# Patient Record
Sex: FEMALE | Race: WHITE | NOT HISPANIC OR LATINO | Employment: OTHER | URBAN - METROPOLITAN AREA
[De-identification: names, ages, dates, MRNs, and addresses within clinical notes are randomized per-mention and may not be internally consistent; named-entity substitution may affect disease eponyms.]

---

## 2024-09-13 ENCOUNTER — APPOINTMENT (OUTPATIENT)
Dept: CT IMAGING | Facility: CLINIC | Age: 79
End: 2024-09-13
Attending: EMERGENCY MEDICINE
Payer: COMMERCIAL

## 2024-09-13 ENCOUNTER — HOSPITAL ENCOUNTER (OUTPATIENT)
Facility: CLINIC | Age: 79
Setting detail: OBSERVATION
Discharge: HOME | End: 2024-09-15
Attending: EMERGENCY MEDICINE | Admitting: EMERGENCY MEDICINE
Payer: COMMERCIAL

## 2024-09-13 ENCOUNTER — APPOINTMENT (OUTPATIENT)
Dept: MRI IMAGING | Facility: CLINIC | Age: 79
End: 2024-09-13
Attending: STUDENT IN AN ORGANIZED HEALTH CARE EDUCATION/TRAINING PROGRAM
Payer: COMMERCIAL

## 2024-09-13 DIAGNOSIS — R47.81 SLURRED SPEECH: ICD-10-CM

## 2024-09-13 DIAGNOSIS — R29.6 MULTIPLE FALLS: ICD-10-CM

## 2024-09-13 DIAGNOSIS — R29.818 TRANSIENT NEUROLOGICAL SYMPTOMS: ICD-10-CM

## 2024-09-13 LAB
ALBUMIN SERPL BCG-MCNC: 4.3 G/DL (ref 3.5–5.2)
ALBUMIN UR-MCNC: NEGATIVE MG/DL
ALP SERPL-CCNC: 85 U/L (ref 40–150)
ALT SERPL W P-5'-P-CCNC: 23 U/L (ref 0–50)
ANION GAP SERPL CALCULATED.3IONS-SCNC: 12 MMOL/L (ref 7–15)
APPEARANCE UR: CLEAR
AST SERPL W P-5'-P-CCNC: 25 U/L (ref 0–45)
BASOPHILS # BLD AUTO: 0.1 10E3/UL (ref 0–0.2)
BASOPHILS NFR BLD AUTO: 0 %
BILIRUB DIRECT SERPL-MCNC: 0.2 MG/DL (ref 0–0.3)
BILIRUB SERPL-MCNC: 1 MG/DL
BILIRUB UR QL STRIP: NEGATIVE
BUN SERPL-MCNC: 12.7 MG/DL (ref 8–23)
CALCIUM SERPL-MCNC: 9.2 MG/DL (ref 8.8–10.4)
CHLORIDE SERPL-SCNC: 95 MMOL/L (ref 98–107)
COLOR UR AUTO: NORMAL
CREAT SERPL-MCNC: 1.12 MG/DL (ref 0.51–0.95)
EGFRCR SERPLBLD CKD-EPI 2021: 51 ML/MIN/1.73M2
EOSINOPHIL # BLD AUTO: 0 10E3/UL (ref 0–0.7)
EOSINOPHIL NFR BLD AUTO: 0 %
ERYTHROCYTE [DISTWIDTH] IN BLOOD BY AUTOMATED COUNT: 14 % (ref 10–15)
GLUCOSE SERPL-MCNC: 112 MG/DL (ref 70–99)
GLUCOSE UR STRIP-MCNC: NEGATIVE MG/DL
HCO3 SERPL-SCNC: 24 MMOL/L (ref 22–29)
HCT VFR BLD AUTO: 32.6 % (ref 35–47)
HGB BLD-MCNC: 10.9 G/DL (ref 11.7–15.7)
HGB UR QL STRIP: NEGATIVE
IMM GRANULOCYTES # BLD: 0.1 10E3/UL
IMM GRANULOCYTES NFR BLD: 1 %
KETONES UR STRIP-MCNC: NEGATIVE MG/DL
LEUKOCYTE ESTERASE UR QL STRIP: NEGATIVE
LYMPHOCYTES # BLD AUTO: 2.3 10E3/UL (ref 0.8–5.3)
LYMPHOCYTES NFR BLD AUTO: 18 %
MAGNESIUM SERPL-MCNC: 2 MG/DL (ref 1.7–2.3)
MCH RBC QN AUTO: 29.8 PG (ref 26.5–33)
MCHC RBC AUTO-ENTMCNC: 33.4 G/DL (ref 31.5–36.5)
MCV RBC AUTO: 89 FL (ref 78–100)
MONOCYTES # BLD AUTO: 1.3 10E3/UL (ref 0–1.3)
MONOCYTES NFR BLD AUTO: 10 %
NEUTROPHILS # BLD AUTO: 9.4 10E3/UL (ref 1.6–8.3)
NEUTROPHILS NFR BLD AUTO: 72 %
NITRATE UR QL: NEGATIVE
NRBC # BLD AUTO: 0 10E3/UL
NRBC BLD AUTO-RTO: 0 /100
PH UR STRIP: 6.5 [PH] (ref 5–7)
PHOSPHATE SERPL-MCNC: 3.3 MG/DL (ref 2.5–4.5)
PLATELET # BLD AUTO: 225 10E3/UL (ref 150–450)
POTASSIUM SERPL-SCNC: 4.1 MMOL/L (ref 3.4–5.3)
PROT SERPL-MCNC: 7.1 G/DL (ref 6.4–8.3)
RBC # BLD AUTO: 3.66 10E6/UL (ref 3.8–5.2)
RBC URINE: 1 /HPF
SARS-COV-2 RNA RESP QL NAA+PROBE: NEGATIVE
SODIUM SERPL-SCNC: 131 MMOL/L (ref 135–145)
SP GR UR STRIP: 1 (ref 1–1.03)
SQUAMOUS EPITHELIAL: <1 /HPF
UROBILINOGEN UR STRIP-MCNC: NORMAL MG/DL
WBC # BLD AUTO: 13.1 10E3/UL (ref 4–11)
WBC URINE: <1 /HPF

## 2024-09-13 PROCEDURE — 70496 CT ANGIOGRAPHY HEAD: CPT

## 2024-09-13 PROCEDURE — 80053 COMPREHEN METABOLIC PANEL: CPT | Performed by: EMERGENCY MEDICINE

## 2024-09-13 PROCEDURE — 250N000009 HC RX 250: Performed by: EMERGENCY MEDICINE

## 2024-09-13 PROCEDURE — 99285 EMERGENCY DEPT VISIT HI MDM: CPT | Mod: 25

## 2024-09-13 PROCEDURE — 250N000013 HC RX MED GY IP 250 OP 250 PS 637: Performed by: INTERNAL MEDICINE

## 2024-09-13 PROCEDURE — G0378 HOSPITAL OBSERVATION PER HR: HCPCS

## 2024-09-13 PROCEDURE — 81001 URINALYSIS AUTO W/SCOPE: CPT | Performed by: EMERGENCY MEDICINE

## 2024-09-13 PROCEDURE — 80048 BASIC METABOLIC PNL TOTAL CA: CPT | Performed by: EMERGENCY MEDICINE

## 2024-09-13 PROCEDURE — 36415 COLL VENOUS BLD VENIPUNCTURE: CPT | Performed by: EMERGENCY MEDICINE

## 2024-09-13 PROCEDURE — 87635 SARS-COV-2 COVID-19 AMP PRB: CPT | Performed by: EMERGENCY MEDICINE

## 2024-09-13 PROCEDURE — 84100 ASSAY OF PHOSPHORUS: CPT | Performed by: INTERNAL MEDICINE

## 2024-09-13 PROCEDURE — 82248 BILIRUBIN DIRECT: CPT | Performed by: INTERNAL MEDICINE

## 2024-09-13 PROCEDURE — 85025 COMPLETE CBC W/AUTO DIFF WBC: CPT | Performed by: EMERGENCY MEDICINE

## 2024-09-13 PROCEDURE — 258N000003 HC RX IP 258 OP 636: Performed by: INTERNAL MEDICINE

## 2024-09-13 PROCEDURE — 99222 1ST HOSP IP/OBS MODERATE 55: CPT | Performed by: INTERNAL MEDICINE

## 2024-09-13 PROCEDURE — 250N000011 HC RX IP 250 OP 636: Performed by: EMERGENCY MEDICINE

## 2024-09-13 PROCEDURE — 70551 MRI BRAIN STEM W/O DYE: CPT

## 2024-09-13 PROCEDURE — 70450 CT HEAD/BRAIN W/O DYE: CPT

## 2024-09-13 PROCEDURE — 83735 ASSAY OF MAGNESIUM: CPT | Performed by: INTERNAL MEDICINE

## 2024-09-13 RX ORDER — GABAPENTIN 100 MG/1
200 CAPSULE ORAL
Status: DISCONTINUED | OUTPATIENT
Start: 2024-09-14 | End: 2024-09-15 | Stop reason: HOSPADM

## 2024-09-13 RX ORDER — LEVOTHYROXINE SODIUM 50 UG/1
50 TABLET ORAL DAILY
COMMUNITY

## 2024-09-13 RX ORDER — BUPROPION HYDROCHLORIDE 150 MG/1
150 TABLET ORAL EVERY MORNING
COMMUNITY

## 2024-09-13 RX ORDER — PROCHLORPERAZINE 25 MG
12.5 SUPPOSITORY, RECTAL RECTAL EVERY 12 HOURS PRN
Status: DISCONTINUED | OUTPATIENT
Start: 2024-09-13 | End: 2024-09-15 | Stop reason: HOSPADM

## 2024-09-13 RX ORDER — ACETAMINOPHEN 325 MG/10.15ML
650 LIQUID ORAL EVERY 4 HOURS PRN
Status: DISCONTINUED | OUTPATIENT
Start: 2024-09-13 | End: 2024-09-15 | Stop reason: HOSPADM

## 2024-09-13 RX ORDER — ATORVASTATIN CALCIUM 40 MG/1
40 TABLET, FILM COATED ORAL DAILY
COMMUNITY

## 2024-09-13 RX ORDER — ASPIRIN 81 MG/1
81 TABLET, CHEWABLE ORAL DAILY
COMMUNITY

## 2024-09-13 RX ORDER — GABAPENTIN 300 MG/1
600 CAPSULE ORAL 2 TIMES DAILY
Status: DISCONTINUED | OUTPATIENT
Start: 2024-09-13 | End: 2024-09-15 | Stop reason: HOSPADM

## 2024-09-13 RX ORDER — FLUTICASONE FUROATE AND VILANTEROL 100; 25 UG/1; UG/1
1 POWDER RESPIRATORY (INHALATION) DAILY
Status: DISCONTINUED | OUTPATIENT
Start: 2024-09-14 | End: 2024-09-15 | Stop reason: HOSPADM

## 2024-09-13 RX ORDER — ASPIRIN 81 MG/1
81 TABLET, CHEWABLE ORAL DAILY
Status: DISCONTINUED | OUTPATIENT
Start: 2024-09-14 | End: 2024-09-15 | Stop reason: HOSPADM

## 2024-09-13 RX ORDER — IOPAMIDOL 755 MG/ML
67 INJECTION, SOLUTION INTRAVASCULAR ONCE
Status: COMPLETED | OUTPATIENT
Start: 2024-09-13 | End: 2024-09-13

## 2024-09-13 RX ORDER — ALENDRONATE SODIUM 70 MG/1
70 TABLET ORAL
COMMUNITY

## 2024-09-13 RX ORDER — ONDANSETRON 4 MG/1
4 TABLET, ORALLY DISINTEGRATING ORAL EVERY 6 HOURS PRN
Status: DISCONTINUED | OUTPATIENT
Start: 2024-09-13 | End: 2024-09-15 | Stop reason: HOSPADM

## 2024-09-13 RX ORDER — GABAPENTIN 300 MG/1
300 CAPSULE ORAL
COMMUNITY

## 2024-09-13 RX ORDER — CYCLOSPORINE 0.5 MG/ML
1 EMULSION OPHTHALMIC 2 TIMES DAILY
COMMUNITY

## 2024-09-13 RX ORDER — ONDANSETRON 2 MG/ML
4 INJECTION INTRAMUSCULAR; INTRAVENOUS EVERY 6 HOURS PRN
Status: DISCONTINUED | OUTPATIENT
Start: 2024-09-13 | End: 2024-09-15 | Stop reason: HOSPADM

## 2024-09-13 RX ORDER — GABAPENTIN 300 MG/1
600 CAPSULE ORAL 2 TIMES DAILY
COMMUNITY

## 2024-09-13 RX ORDER — PROCHLORPERAZINE MALEATE 5 MG
5 TABLET ORAL EVERY 6 HOURS PRN
Status: DISCONTINUED | OUTPATIENT
Start: 2024-09-13 | End: 2024-09-15 | Stop reason: HOSPADM

## 2024-09-13 RX ORDER — SERTRALINE HYDROCHLORIDE 100 MG/1
150 TABLET, FILM COATED ORAL DAILY
COMMUNITY

## 2024-09-13 RX ORDER — BUPROPION HYDROCHLORIDE 150 MG/1
150 TABLET ORAL EVERY MORNING
Status: DISCONTINUED | OUTPATIENT
Start: 2024-09-14 | End: 2024-09-15 | Stop reason: HOSPADM

## 2024-09-13 RX ORDER — ATORVASTATIN CALCIUM 40 MG/1
40 TABLET, FILM COATED ORAL DAILY
Status: DISCONTINUED | OUTPATIENT
Start: 2024-09-14 | End: 2024-09-15 | Stop reason: HOSPADM

## 2024-09-13 RX ORDER — LEVOTHYROXINE SODIUM 50 UG/1
50 TABLET ORAL DAILY
Status: DISCONTINUED | OUTPATIENT
Start: 2024-09-14 | End: 2024-09-15 | Stop reason: HOSPADM

## 2024-09-13 RX ORDER — LIDOCAINE 40 MG/G
CREAM TOPICAL
Status: DISCONTINUED | OUTPATIENT
Start: 2024-09-13 | End: 2024-09-15 | Stop reason: HOSPADM

## 2024-09-13 RX ORDER — ACETAMINOPHEN 650 MG/1
650 SUPPOSITORY RECTAL EVERY 4 HOURS PRN
Status: DISCONTINUED | OUTPATIENT
Start: 2024-09-13 | End: 2024-09-15 | Stop reason: HOSPADM

## 2024-09-13 RX ORDER — ALBUTEROL SULFATE 90 UG/1
1-2 AEROSOL, METERED RESPIRATORY (INHALATION) EVERY 6 HOURS PRN
COMMUNITY

## 2024-09-13 RX ORDER — ALBUTEROL SULFATE 90 UG/1
1-2 AEROSOL, METERED RESPIRATORY (INHALATION) EVERY 6 HOURS PRN
Status: DISCONTINUED | OUTPATIENT
Start: 2024-09-13 | End: 2024-09-15 | Stop reason: HOSPADM

## 2024-09-13 RX ORDER — ACETAMINOPHEN 325 MG/1
650 TABLET ORAL EVERY 4 HOURS PRN
Status: DISCONTINUED | OUTPATIENT
Start: 2024-09-13 | End: 2024-09-15 | Stop reason: HOSPADM

## 2024-09-13 RX ADMIN — GABAPENTIN 600 MG: 300 CAPSULE ORAL at 23:40

## 2024-09-13 RX ADMIN — SODIUM CHLORIDE 500 ML: 9 INJECTION, SOLUTION INTRAVENOUS at 23:36

## 2024-09-13 RX ADMIN — SODIUM CHLORIDE 80 ML: 9 INJECTION, SOLUTION INTRAVENOUS at 19:28

## 2024-09-13 RX ADMIN — IOPAMIDOL 67 ML: 755 INJECTION, SOLUTION INTRAVENOUS at 19:27

## 2024-09-13 ASSESSMENT — ACTIVITIES OF DAILY LIVING (ADL)
ADLS_ACUITY_SCORE: 35

## 2024-09-13 NOTE — ED PROVIDER NOTES
Emergency Department Note      History of Present Illness     Chief Complaint   Lower extremity weakness/gait problem and slurred speech      HPI   Shae Chinchilla is a 78 year old female presents with lower extremity weakness and episode of slurred speech.  Patient reports that 2 days ago she was experiencing some feeling of weakness in her lower extremities that had since resolved.  Today at 4:45 PM she was picking rocks with her daughter for rock painting which she had a sudden onset of gait instability/weakness in her lower extremities as well as 15 minutes of notable slurred speech.  EMS contacted.  On arrival patient was noted to be unsteady in her feet though able to stand and pivot to the gurney.  Blood glucose 138 with blood pressure of 120s/60s with heart rate in 60s.  No reported dizziness, lightheadedness, or loss of consciousness.  Patient also denies chest pain, shortness of breath, or abdominal pain.  Symptoms are now resolved.  Past history of substance abuse; endorses years of sobriety.  Former smoker.    Independent Historian   Daughter as detailed above.    Review of External Notes   N/A    Past Medical History     Medical History and Problem List   Depression  Squamous cell carcinoma of skin  Coronary atherosclerosis  PFO  COPD  Hyperlipidemia  History of substance abuse   Stroke      Medications   Medications  Medication Sig Dispensed Refills Start Date End Date Status   Calcium Carb-Cholecalciferol (CALCIUM CARBONATE-VITAMIN D3) 600-400 MG-UNIT TABS  Take by mouth daily.     09/04/2022   Active   multivitamin with minerals tablet  Take 1 Tablet by mouth daily.     09/04/2022   Active   ASPIRIN LOW DOSE 81 MG enteric coated tablet  Take 1 Tablet (81 mg) by mouth daily.     04/06/2023   Active   tazarotene (TAZORAC) 0.1 % cream   Indications: Acne vulgaris Apply topically daily at bedtime. 60 g  11 07/27/2023   Active   clindamycin (CLEOCIN T) 1 % external solution   Indications: Acne vulgaris  Apply topically two times a day. 60 mL  11 07/27/2023   Active   alendronate (FOSAMAX) 70 MG tablet  Take 1 Tablet (70 mg) by mouth once every week. 12 Tablet  3 03/20/2024   Active   ZOLOFT 100 MG tablet   Indications: Major depressive disorder with single episode, in partial remission (HRC) TAKE 1 AND 1/2 TABLETS DAILY 135 Tablet  3 05/14/2024   Active   fluticasone-umeclidin-vilant (TRELEGY ELLIPTA) 100-62.5-25 MCG/ACT inhaler   Indications: Chronic obstructive pulmonary disease, unspecified COPD type (HRC) Inhale 1 Puff daily. Rinse mouth/gargle after use 3 Each  3 08/12/2024   Active   omeprazole (PRILOSEC) 20 MG capsule   Indications: Gastroesophageal reflux disease, unspecified whether esophagitis present Take 1 Capsule (20 mg) by mouth daily. Take 1 hour before a meal. 90 Capsule  3 08/12/2024   Active   gabapentin (NEURONTIN) 300 MG capsule   Indications: Low back pain radiating to left leg Take 2 Capsules (600 mg) by mouth two times a day AND 1 Capsule (300 mg) daily. 600 mg morning and evening and 300 mg mid-day. 450 Capsule  3 08/12/2024   Active   buPROPion (WELLBUTRIN XL) 150 MG 24 hour release tablet   Indications: Major depressive disorder with single episode, in partial remission (HRC) Take 1 Tablet (150 mg) by mouth daily. 90 Tablet  3 08/12/2024   Active   levothyroxine (SYNTHROID) 50 MCG tablet   Indications: Acquired hypothyroidism (HRC) Take 1 Tablet (50 mcg) by mouth daily. 90 Tablet  3 08/12/2024   Active   atorvastatin (LIPITOR) 40 MG tablet  Take 1 Tablet (40 mg) by mouth daily. 90 Tablet  3 08/12/2024   Active   cycloSPORINE (RESTASIS) 0.05 % eye drop emulsion   Indications: Dry eyes Place 1 Drop into both eyes two times a day. 5.5 mL  6 08/14/2024   Active   ALBUterol sulfate  (90 Base) MCG/ACT inhaler   Indications: Chronic obstructive pulmonary disease, unspecified COPD type (HRC) INHALE 1 TO 2 PUFFS EVERY 6 HOURS AS NEEDED FOR WHEEZING. 3 Each  3 08/23/2024   Active   ALBUterol  "sulfate  (90 Base) MCG/ACT inhaler   Indications: Chronic obstructive pulmonary disease, unspecified COPD type (HRC) INHALE 1 TO 2 PUFFS EVERY 6 HOURS AS NEEDED FOR WHEEZING. 8.5 g  3 05/14/2024 08/23/2024 Discontinued     Surgical History   Cosmetic surgery  Tubal ligation    Physical Exam     Patient Vitals for the past 24 hrs:   BP Temp Temp src Pulse Resp SpO2 Height Weight   09/13/24 2002 -- 98.4  F (36.9  C) Oral -- -- -- -- --   09/13/24 1905 113/64 -- -- 81 20 94 % -- --   09/13/24 1800 102/61 -- -- 85 21 94 % -- --   09/13/24 1725 (!) 141/78 100  F (37.8  C) Tympanic 89 17 95 % 1.575 m (5' 2\") 63.5 kg (140 lb)     Physical Exam  General: Alert and cooperative with exam. Patient in mild distress. Normal mentation.  Head:  Scalp is NC/AT  Eyes:  No scleral icterus, PERRL, EOMI  ENT:  The external nose and ears are normal. The oropharynx is normal and without erythema; mucus membranes are moist. Uvula midline, no evidence of deep space infection.  Neck:  Normal range of motion without rigidity.  CV:  Regular rate and rhythm  Resp:  Breath sounds are clear bilaterally    Non-labored, no retractions or accessory muscle use  GI:  Abdomen is soft, no distension, no tenderness. No peritoneal signs  MS:  No lower extremity edema   Skin:  Warm and dry, No rash or lesions noted.  Neuro: Oriented x 3. No gross motor or sensory deficits.  CN II through XII intact.  Normal upper and lower extremity coordination.  No significant dysarthria or aphasia.      Diagnostics     Lab Results   Labs Ordered and Resulted from Time of ED Arrival to Time of ED Departure   BASIC METABOLIC PANEL - Abnormal       Result Value    Sodium 131 (*)     Potassium 4.1      Chloride 95 (*)     Carbon Dioxide (CO2) 24      Anion Gap 12      Urea Nitrogen 12.7      Creatinine 1.12 (*)     GFR Estimate 51 (*)     Calcium 9.2      Glucose 112 (*)    CBC WITH PLATELETS AND DIFFERENTIAL - Abnormal    WBC Count 13.1 (*)     RBC Count 3.66 (*)  "    Hemoglobin 10.9 (*)     Hematocrit 32.6 (*)     MCV 89      MCH 29.8      MCHC 33.4      RDW 14.0      Platelet Count 225      % Neutrophils 72      % Lymphocytes 18      % Monocytes 10      % Eosinophils 0      % Basophils 0      % Immature Granulocytes 1      NRBCs per 100 WBC 0      Absolute Neutrophils 9.4 (*)     Absolute Lymphocytes 2.3      Absolute Monocytes 1.3      Absolute Eosinophils 0.0      Absolute Basophils 0.1      Absolute Immature Granulocytes 0.1      Absolute NRBCs 0.0     ROUTINE UA WITH MICROSCOPIC - Normal    Color Urine Straw      Appearance Urine Clear      Glucose Urine Negative      Bilirubin Urine Negative      Ketones Urine Negative      Specific Gravity Urine 1.005      Blood Urine Negative      pH Urine 6.5      Protein Albumin Urine Negative      Urobilinogen Urine Normal      Nitrite Urine Negative      Leukocyte Esterase Urine Negative      RBC Urine 1      WBC Urine <1      Squamous Epithelials Urine <1     COVID-19 VIRUS (CORONAVIRUS) BY PCR - Normal    SARS CoV2 PCR Negative     HEPATIC FUNCTION PANEL - Normal    Protein Total 7.1      Albumin 4.3      Bilirubin Total 1.0      Alkaline Phosphatase 85      AST 25      ALT 23      Bilirubin Direct 0.20     PHOSPHORUS   MAGNESIUM       Imaging   CTA Head Neck with Contrast   Final Result   IMPRESSION:    HEAD CT:   1.  No acute intracranial findings.   2.  Moderate presumed chronic microvascular ischemic changes of the white matter.      HEAD CTA:    1.  No significant stenosis or occlusion of the [place] vasculature.      NECK CTA:   1.  No significant stenosis of the major arterial vasculature of the neck.      Head CT w/o contrast   Final Result   IMPRESSION:    HEAD CT:   1.  No acute intracranial findings.   2.  Moderate presumed chronic microvascular ischemic changes of the white matter.      HEAD CTA:    1.  No significant stenosis or occlusion of the [place] vasculature.      NECK CTA:   1.  No  significant stenosis of the major arterial vasculature of the neck.      MR Brain w/o Contrast    (Results Pending)       ECG  ECG taken at 1724, ECG read at 2000  Normal sinus rhythm  Normal ECG   Rate 87 bpm. NE interval 198 ms. QRS duration 80 ms. QT/QTc 362/435 ms. P-R-T axes -13 37 42.     Independent Interpretation   CT Head: No intracranial hemorrhage.    ED Course      Medications Administered   Medications   iopamidol (ISOVUE-370) solution 67 mL (67 mLs Intravenous $Given 9/13/24 1927)   Saline Flush (80 mLs Intravenous $Given 9/13/24 1928)       Procedures   Procedures     Discussion of Management   Admitting Hospitalist, Dr. Martin  Stroke neurology fellow    ED Course   ED Course as of 09/13/24 2157   Fri Sep 13, 2024   1714 I obtained history and examined the patient as noted above.       Additional Documentation  None    Medical Decision Making / Diagnosis     CMS Diagnoses: None    MIPS       None    MDM   Shae Chinchilla is a 78 year old female presents with episode of slurred speech as well as lower extremity weakness/gait instability.  Patient's medical history and records were reviewed.  On evaluation the patient's NIH stroke score is 0 and her symptoms have resolved.  No evidence of significant trauma related to recent multiple falls.  CT/CTA imaging of the patient's head and neck without acute pathology as above.  EKG demonstrates normal sinus rhythm without evidence of acute ischemia, infarction, or significant arrhythmia.  Labs notable for mild hyponatremia (sodium 131), mildly elevated white count (13.1; no evidence of infectious process), mild anemia (hemoglobin 10.9), and UA without evidence of infection.  Initial temperature was 100  F, this improved without intervention.  No clinical evidence of encephalitis/meningitis.  COVID testing negative.  Discussed with stroke neurology service; recommended brain MRI.  Given patient's age, multiple recent falls (lives independently), and concern  for potential TIA, patient was admitted to observation with the hospitalist service; will defer MRI to inpatient team.    Disposition   The patient was admitted to the hospital.     Diagnosis     ICD-10-CM    1. Transient neurological symptoms  R29.818       2. Slurred speech  R47.81       3. Multiple falls  R29.6                 William Dixon,   09/13/24 2206

## 2024-09-13 NOTE — ED TRIAGE NOTES
BIBA from home for heaviness in both legs, unsteady gait, and 10 minute episode of slurred speech earlier today. Patient states she has had multiple falls today with headstrike.      Triage Assessment (Adult)       Row Name 09/13/24 1754 09/13/24 1727       Triage Assessment    Airway WDL -- WDL       Respiratory WDL    Respiratory WDL -- WDL       Skin Circulation/Temperature WDL    Skin Circulation/Temperature WDL -- WDL       Cardiac WDL    Cardiac WDL -- WDL       Peripheral/Neurovascular WDL    Peripheral Neurovascular WDL -- WDL       Cognitive/Neuro/Behavioral WDL    Cognitive/Neuro/Behavioral WDL WDL --    Level of Consciousness alert --    Arousal Level opens eyes spontaneously --    Orientation oriented x 4 --    Mood/Behavior calm;cooperative --       Pupils (CN II)    Pupil PERRLA yes --    Pupil Size Left 3 mm --    Pupil Size Right 3 mm --

## 2024-09-13 NOTE — ED NOTES
Bed: ED06  Expected date:   Expected time:   Means of arrival:   Comments:  523   78 f sudden onset of weakness in lower extremeties  1705

## 2024-09-14 ENCOUNTER — APPOINTMENT (OUTPATIENT)
Dept: CARDIOLOGY | Facility: CLINIC | Age: 79
End: 2024-09-14
Attending: INTERNAL MEDICINE
Payer: COMMERCIAL

## 2024-09-14 ENCOUNTER — APPOINTMENT (OUTPATIENT)
Dept: GENERAL RADIOLOGY | Facility: CLINIC | Age: 79
End: 2024-09-14
Attending: INTERNAL MEDICINE
Payer: COMMERCIAL

## 2024-09-14 ENCOUNTER — APPOINTMENT (OUTPATIENT)
Dept: PHYSICAL THERAPY | Facility: CLINIC | Age: 79
End: 2024-09-14
Attending: INTERNAL MEDICINE
Payer: COMMERCIAL

## 2024-09-14 LAB
ANION GAP SERPL CALCULATED.3IONS-SCNC: 10 MMOL/L (ref 7–15)
BUN SERPL-MCNC: 12.2 MG/DL (ref 8–23)
CALCIUM SERPL-MCNC: 8.8 MG/DL (ref 8.8–10.4)
CHLORIDE SERPL-SCNC: 106 MMOL/L (ref 98–107)
CREAT SERPL-MCNC: 1.02 MG/DL (ref 0.51–0.95)
EGFRCR SERPLBLD CKD-EPI 2021: 57 ML/MIN/1.73M2
ERYTHROCYTE [DISTWIDTH] IN BLOOD BY AUTOMATED COUNT: 13.9 % (ref 10–15)
GLUCOSE BLDC GLUCOMTR-MCNC: 101 MG/DL (ref 70–99)
GLUCOSE BLDC GLUCOMTR-MCNC: 103 MG/DL (ref 70–99)
GLUCOSE BLDC GLUCOMTR-MCNC: 92 MG/DL (ref 70–99)
GLUCOSE BLDC GLUCOMTR-MCNC: 99 MG/DL (ref 70–99)
GLUCOSE SERPL-MCNC: 109 MG/DL (ref 70–99)
HCO3 SERPL-SCNC: 26 MMOL/L (ref 22–29)
HCT VFR BLD AUTO: 33.4 % (ref 35–47)
HGB BLD-MCNC: 11.1 G/DL (ref 11.7–15.7)
LVEF ECHO: NORMAL
MCH RBC QN AUTO: 30.3 PG (ref 26.5–33)
MCHC RBC AUTO-ENTMCNC: 33.2 G/DL (ref 31.5–36.5)
MCV RBC AUTO: 91 FL (ref 78–100)
PLATELET # BLD AUTO: 215 10E3/UL (ref 150–450)
POTASSIUM SERPL-SCNC: 4.1 MMOL/L (ref 3.4–5.3)
RBC # BLD AUTO: 3.66 10E6/UL (ref 3.8–5.2)
SODIUM SERPL-SCNC: 142 MMOL/L (ref 135–145)
WBC # BLD AUTO: 9.8 10E3/UL (ref 4–11)

## 2024-09-14 PROCEDURE — 36415 COLL VENOUS BLD VENIPUNCTURE: CPT | Performed by: INTERNAL MEDICINE

## 2024-09-14 PROCEDURE — 250N000013 HC RX MED GY IP 250 OP 250 PS 637: Performed by: INTERNAL MEDICINE

## 2024-09-14 PROCEDURE — 255N000002 HC RX 255 OP 636: Performed by: INTERNAL MEDICINE

## 2024-09-14 PROCEDURE — 97161 PT EVAL LOW COMPLEX 20 MIN: CPT | Mod: GP

## 2024-09-14 PROCEDURE — 99232 SBSQ HOSP IP/OBS MODERATE 35: CPT | Performed by: INTERNAL MEDICINE

## 2024-09-14 PROCEDURE — G0378 HOSPITAL OBSERVATION PER HR: HCPCS

## 2024-09-14 PROCEDURE — 85014 HEMATOCRIT: CPT | Performed by: INTERNAL MEDICINE

## 2024-09-14 PROCEDURE — 80048 BASIC METABOLIC PNL TOTAL CA: CPT | Performed by: INTERNAL MEDICINE

## 2024-09-14 PROCEDURE — 93306 TTE W/DOPPLER COMPLETE: CPT | Mod: 26 | Performed by: INTERNAL MEDICINE

## 2024-09-14 PROCEDURE — 71046 X-RAY EXAM CHEST 2 VIEWS: CPT

## 2024-09-14 PROCEDURE — C8929 TTE W OR WO FOL WCON,DOPPLER: HCPCS

## 2024-09-14 PROCEDURE — 99221 1ST HOSP IP/OBS SF/LOW 40: CPT | Mod: GC | Performed by: STUDENT IN AN ORGANIZED HEALTH CARE EDUCATION/TRAINING PROGRAM

## 2024-09-14 PROCEDURE — 82962 GLUCOSE BLOOD TEST: CPT

## 2024-09-14 RX ORDER — CYCLOSPORINE 0.5 MG/ML
1 EMULSION OPHTHALMIC 2 TIMES DAILY
Status: DISCONTINUED | OUTPATIENT
Start: 2024-09-14 | End: 2024-09-15 | Stop reason: HOSPADM

## 2024-09-14 RX ADMIN — ATORVASTATIN CALCIUM 40 MG: 40 TABLET, FILM COATED ORAL at 09:31

## 2024-09-14 RX ADMIN — SERTRALINE HYDROCHLORIDE 150 MG: 100 TABLET ORAL at 09:31

## 2024-09-14 RX ADMIN — GABAPENTIN 600 MG: 300 CAPSULE ORAL at 20:12

## 2024-09-14 RX ADMIN — HUMAN ALBUMIN MICROSPHERES AND PERFLUTREN 9 ML: 10; .22 INJECTION, SOLUTION INTRAVENOUS at 14:59

## 2024-09-14 RX ADMIN — GABAPENTIN 600 MG: 300 CAPSULE ORAL at 09:30

## 2024-09-14 RX ADMIN — BUPROPION HYDROCHLORIDE 150 MG: 150 TABLET, EXTENDED RELEASE ORAL at 09:31

## 2024-09-14 RX ADMIN — FLUTICASONE FUROATE AND VILANTEROL TRIFENATATE 1 PUFF: 100; 25 POWDER RESPIRATORY (INHALATION) at 09:37

## 2024-09-14 RX ADMIN — GABAPENTIN 200 MG: 100 CAPSULE ORAL at 16:43

## 2024-09-14 RX ADMIN — LEVOTHYROXINE SODIUM 50 MCG: 50 TABLET ORAL at 09:30

## 2024-09-14 RX ADMIN — ASPIRIN 81 MG CHEWABLE TABLET 81 MG: 81 TABLET CHEWABLE at 09:30

## 2024-09-14 RX ADMIN — UMECLIDINIUM 1 PUFF: 62.5 AEROSOL, POWDER ORAL at 09:38

## 2024-09-14 ASSESSMENT — ACTIVITIES OF DAILY LIVING (ADL)
ADLS_ACUITY_SCORE: 34
DEPENDENT_IADLS:: TRANSPORTATION
ADLS_ACUITY_SCORE: 34
ADLS_ACUITY_SCORE: 33
ADLS_ACUITY_SCORE: 34
ADLS_ACUITY_SCORE: 33
ADLS_ACUITY_SCORE: 34
ADLS_ACUITY_SCORE: 34
ADLS_ACUITY_SCORE: 33
ADLS_ACUITY_SCORE: 34
ADLS_ACUITY_SCORE: 33
ADLS_ACUITY_SCORE: 34
ADLS_ACUITY_SCORE: 34
ADLS_ACUITY_SCORE: 33
ADLS_ACUITY_SCORE: 34
ADLS_ACUITY_SCORE: 34
ADLS_ACUITY_SCORE: 33
ADLS_ACUITY_SCORE: 34

## 2024-09-14 NOTE — CONSULTS
"[facility]    Stroke Consult Note    Reason for Consult:      Chief Complaint: Extremity Weakness and Gait Problem       HPI  Shae Chinchilla is a 78 year old female with past medical history significant for ischemic stroke, depression hypothyroidism presents with concerns of episodes of bilateral lower extremity weakness and slurred speech.    Reportedly Shae had an episode of bilateral lower extremity weakness she described the episode as her legs feeling heavy and gave out, she was also noted to have slurred speech lasting for about 15 minutes at around 1645. Patient feels that she does not have any deficits as of now.  She states that she takes aspirin 81 mg daily at home.       Stroke Evaluation Summarized    MRI/Head CT No hemorrhage or any acute ischemic stroke on MRI brain   Intracranial Vasculature No LVO   Cervical Vasculature No LVO     Echocardiogram Left ventricular size, global systolic function, and wall motion are normal,  estimated LVEF 60-65%.  Right ventricular global function is normal.  No significant valvular abnormalities.  The inferior vena cava was normal in size with preserved respiratory  variability.   EKG/Telemetry Not available   Other Testing Not Applicable      LDL  No lab value available in past 30 days   A1C  No lab value available in past 90 days   Troponin No lab value available in past 48 hrs       Impression    Episode of bilateral lower extremity weakness/slurred speech less likely a neurovascular event    Plan  Continue PTA ASA mg and statin qd        Thank you for this consult. No further stroke evaluation is recommended, so we will sign off. Please contact us with any additional questions.    The Stroke Staff is Dr. Bello who is a stroke director of this hospital the doctors lauren Willett MD  Vascular Neurology Fellow    To page me or covering stroke neurology team member, click here: [facility]  Choose \"On Call\" tab " "at top, then select \"NEUROLOGY/ALL SITES\" from middle drop-down box, press Enter, then look for \"stroke\" or \"telestroke\" for your site.  _____________________________________________________    Clinically Significant Risk Factors Present on Admission                # Drug Induced Platelet Defect: home medication list includes an antiplatelet medication           # Overweight: Estimated body mass index is 26.47 kg/m  as calculated from the following:    Height as of this encounter: 1.575 m (5' 2\").    Weight as of this encounter: 65.6 kg (144 lb 11.2 oz).       # Financial/Environmental Concerns: none             Past Medical History    No past medical history on file.  Medications   Home Meds  Prior to Admission medications    Medication Sig Start Date End Date Taking? Authorizing Provider   albuterol (PROAIR HFA/PROVENTIL HFA/VENTOLIN HFA) 108 (90 Base) MCG/ACT inhaler Inhale 1-2 puffs into the lungs every 6 hours as needed for shortness of breath, wheezing or cough.   Yes Unknown, Entered By History   alendronate (FOSAMAX) 70 MG tablet Take 70 mg by mouth every 7 days.   Yes Unknown, Entered By History   aspirin (ASA) 81 MG chewable tablet Take 81 mg by mouth daily.   Yes Unknown, Entered By History   atorvastatin (LIPITOR) 40 MG tablet Take 40 mg by mouth daily.   Yes Unknown, Entered By History   buPROPion (WELLBUTRIN XL) 150 MG 24 hr tablet Take 150 mg by mouth every morning.   Yes Unknown, Entered By History   cycloSPORINE (RESTASIS) 0.05 % ophthalmic emulsion Place 1 drop into both eyes 2 times daily.   Yes Unknown, Entered By History   Fluticasone-Umeclidin-Vilant (TRELEGY ELLIPTA) 100-62.5-25 MCG/ACT oral inhaler Inhale 1 puff into the lungs daily.   Yes Unknown, Entered By History   gabapentin (NEURONTIN) 300 MG capsule Take 600 mg by mouth 2 times daily.   Yes Unknown, Entered By History   gabapentin (NEURONTIN) 300 MG capsule Take 300 mg by mouth daily at 4pm.   Yes Unknown, Entered By History "   levothyroxine (SYNTHROID/LEVOTHROID) 50 MCG tablet Take 50 mcg by mouth daily.   Yes Unknown, Entered By History   omeprazole (PRILOSEC) 20 MG DR capsule Take 20 mg by mouth daily as needed.   Yes Unknown, Entered By History   sertraline (ZOLOFT) 100 MG tablet Take 150 mg by mouth daily.   Yes Unknown, Entered By History       Scheduled Meds  Current Facility-Administered Medications   Medication Dose Route Frequency Provider Last Rate Last Admin    aspirin (ASA) chewable tablet 81 mg  81 mg Oral Daily Brittany Martin MD   81 mg at 09/14/24 0930    atorvastatin (LIPITOR) tablet 40 mg  40 mg Oral Daily Brittany Martin MD   40 mg at 09/14/24 0931    buPROPion (WELLBUTRIN XL) 24 hr tablet 150 mg  150 mg Oral QAM Brittany Martin MD   150 mg at 09/14/24 0931    cycloSPORINE (RESTASIS) 0.05 % ophthalmic emulsion 1 drop  1 drop Both Eyes BID Aura Mcknight DO        fluticasone-vilanterol (BREO ELLIPTA) 100-25 MCG/ACT inhaler 1 puff  1 puff Inhalation Daily Brittany Martin MD   1 puff at 09/14/24 0937    And    umeclidinium (INCRUSE ELLIPTA) 62.5 MCG/ACT inhaler 1 puff  1 puff Inhalation Daily Brittany Martin MD   1 puff at 09/14/24 0938    gabapentin (NEURONTIN) capsule 200 mg  200 mg Oral Daily at 4 pm Brittany Martin MD        gabapentin (NEURONTIN) capsule 600 mg  600 mg Oral BID Brittany Martin MD   600 mg at 09/14/24 0930    levothyroxine (SYNTHROID/LEVOTHROID) tablet 50 mcg  50 mcg Oral Daily Brittany Martin MD   50 mcg at 09/14/24 0930    sertraline (ZOLOFT) tablet 150 mg  150 mg Oral Daily Brittany Martin MD   150 mg at 09/14/24 0931    sodium chloride (PF) 0.9% PF flush 3 mL  3 mL Intracatheter Q8H Brittany Martin MD   3 mL at 09/14/24 0938       Infusion Meds  Current Facility-Administered Medications   Medication Dose Route Frequency Provider Last Rate Last Admin       Allergies   No Known Allergies       PHYSICAL EXAMINATION   Temp:   [98.1  F (36.7  C)-100  F (37.8  C)] 99.2  F (37.3  C)  Pulse:  [80-89] 81  Resp:  [17-21] 18  BP: ()/(48-78) 127/51  SpO2:  [94 %-97 %] 95 %    Neurologic  Mental Status:  alert, oriented x 3, follows commands, speech clear and fluent, naming and repetition normal  Cranial Nerves:  visual fields intact, PERRL, EOMI with normal smooth pursuit, facial sensation intact and symmetric, facial movements symmetric, hearing not formally tested but intact to conversation, no dysarthria, shoulder shrug strong bilaterally, tongue protrusion midline  Motor:  normal muscle tone and bulk, no abnormal movements, able to move all limbs spontaneously, strength 5/5 throughout upper and lower extremities, no pronator drift  Reflexes:  toes down-going  Sensory:  light touch sensation intact and symmetric throughout upper and lower extremities, no extinction on double simultaneous stimulation   Coordination:  normal finger-to-nose and heel-to-shin bilaterally without dysmetria, rapid alternating movements symmetric  Station/Gait:  deferred    Stroke Scales    NIHSS  1a. Level of Consciousness  0   1b. LOC Questions  0   1c. LOC Commands  0   2.   Best Gaze  0   3.   Visual  0   4.   Facial Palsy  0   5a. Motor Arm, Left  0   5b. Motor Arm, Right  0   6a. Motor Leg, Left  0   6b. Motor Leg, right  0   7.   Limb Ataxia  0   8.   Sensory  0   9.   Best Language  0   10. Dysarthria  0   11. Extinction and Inattention   0   Total (0)       Imaging  I personally reviewed all imaging; relevant findings per HPI.    Labs Data   CBC  Recent Labs   Lab 09/14/24 1229 09/13/24  1752   WBC 9.8 13.1*   RBC 3.66* 3.66*   HGB 11.1* 10.9*   HCT 33.4* 32.6*    225     Basic Metabolic Panel   Recent Labs   Lab 09/14/24  1229 09/14/24  1119 09/14/24  0745 09/14/24  0223 09/13/24  1752     --   --   --  131*   POTASSIUM 4.1  --   --   --  4.1   CHLORIDE 106  --   --   --  95*   CO2 26  --   --   --  24   BUN 12.2  --   --   --  12.7   CR  1.02*  --   --   --  1.12*   * 92 103*   < > 112*   FRANCINE 8.8  --   --   --  9.2    < > = values in this interval not displayed.     Liver Panel  Recent Labs   Lab 09/13/24  1752   PROTTOTAL 7.1   ALBUMIN 4.3   BILITOTAL 1.0   ALKPHOS 85   AST 25   ALT 23     INR  No lab results found.        Stroke Consult Data Data   This was a non-emergent, non-telestroke consult.

## 2024-09-14 NOTE — PROVIDER NOTIFICATION
MD Notification    Notified Person: MD    Notified Person Name: Brittany Pelletier    Notification Date/Time:    Notification Interaction:    Purpose of Notification:  FYI.   Pt name= Shae Chinchilla   MRI result is ready.   Thanks.  Orders Received:    Comments:

## 2024-09-14 NOTE — PROGRESS NOTES
[facility]    Hospitalist Progress Note    Date of Admission: 9/13/2024    Assessment & Plan   Shae Chinchilla is a 78 year old female with PMHx of  chronic back pain with radiation to left leg, COPD, GERD, depression and hypothyroidism who was admitted under observation status on 9/13/2024 for evaluation of LLE weakness and slurred speech.     Transient bilateral LE weakness and slurred speech, concern for TIA vs other etiology  *On the day of admission, patient reported she was picking rocks with her daughter to pain when she developed sudden onset heaviness in her legs and felt as though she could not control them. This made balancing difficult. Also noted around this time to have a 15 min episode of slurred speech. No dizziness, headache, vision changes, numbness or tingling. Presented to ED for evaluation, however her symptoms had resolved by the time she arrived. Later reported an episode of leg heaviness 1 wk prior that led to a fall, no injury. Chronic low back pain with radiation to her LLE was unchanged.   *In the ED, she was afebrile and VSS. Presentation concerning for possible TIA. Labs notable for WBC 31.1, hgb 10.9, Na 131, . Mag, phos and LFTs okay. UA not suggestive of UTI. CXR neg for infiltrate. EKG showed NSR. Head CT was neg for acute pathology, CTA head/neck neg for significant stenosis. Admitted to observation for further evaluation.   *MRI brain was neg for acute intracranial process.   *Question if her presentation could have been due in part to dehydration? Noted +orthostatic VS on admission which normalized after IVFs.  *Conts on PTA ASA and statin (FLP on 8/12/24 with HDL 49, LDL 55)  -- echo pending  -- neurology consult pending  -- PT recommended discharge home with home PT    Orthostasis: Resolved  *Orthostatic VS +on night of admission. Given IVFs and repeat orthostatics were neg.     Hyponatremia: Resolved  *Na 131 on admission, had been 140 on 8/12  "per review of care everywhere.  *IVFs started on admission. Na improved    Leucocytosis of unclear etiology: Resolved  *WBC 13 on admission. Afebrile and VSS. No s/sx of localized infection. COVID swab neg. CXR neg. UA not suggestive of UTI. Possibly reactive dt above?  *WBC normalized without specific intervention.    Severe coronary calcifications  *Seen on CT chest 3/23/23 (evaluated by cardiology on 6/6/23), on ASA and satin    Hx of PFO  *Per cardiology clinic note from 6/6/23, did not recommend PFO closure     Hx of R caudate stroke  *Prior incidental finding on MRI obtained for work of memory complaint.. Previously seen by neurology 1/17/20 through [facility] system. Chronic and stable on ASA, statin.    Chronic normocytic anemia  *Hgb stable at baseline of 10-11. Iron studies were nl in 2023.     Chronic low back pain with radiation to left leg  *No recent change in nature of symptoms. Chronic and stable on gabapentin.     Hypothyroidism  *TSH 2.86 on 8/12/24. Chronic and stable on levothyroxine.    Depression  *Chronic and stable on Zoloft and Wellbutrin    COPD, not on baseline O2  Hx of tobacco use, in remission  *Chronic and stable on prn albuterol. 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    FEN: no IVFs, lytes stable, regular diet  DVT Prophylaxis: PCDs  Code Status: Full Code    Disposition: Complete TIA workup, consider general neurology consult? Anticipate discharge home tomorrow with home PT once workup complete    Medically Ready for Discharge: Anticipated Tomorrow    Aura Mcknight, DO    Clinically Significant Risk Factors Present on Admission                # Drug Induced Platelet Defect: home medication list includes an antiplatelet medication      # Anemia: based on hgb <11       # Overweight: Estimated body mass index is 26.47 kg/m  as calculated from the following:    Height as of this encounter: 1.575 m (5' 2\").    Weight as of this " encounter: 65.6 kg (144 lb 11.2 oz).             Medical Decision Making       Please see A&P for additional details of medical decision making.         Interval History   Patient seen this morning. Very [place]. No specific complaints. Denies dizziness, cp/sob/cough, abd pain/n/v. No reports of speech changes.     -Data reviewed today: I reviewed all new labs and imaging results over the last 24 hours. I personally reviewed no images or EKG's today.    Physical Exam   Temp: 98.8  F (37.1  C) Temp src: Oral BP: 99/48 Pulse: 80   Resp: 18 SpO2: 95 % O2 Device: None (Room air)    Vitals:    09/13/24 1725 09/13/24 2325   Weight: 63.5 kg (140 lb) 65.6 kg (144 lb 11.2 oz)     Vital Signs with Ranges  Temp:  [98.1  F (36.7  C)-100  F (37.8  C)] 98.8  F (37.1  C)  Pulse:  [80-89] 80  Resp:  [17-21] 18  BP: ()/(48-78) 99/48  SpO2:  [94 %-97 %] 95 %  No intake/output data recorded.    Constitutional: Resting comfortably, [place] but answering questions appropriately, NAD  Respiratory: CTAB, no wheeze, no increased work of breathing  Cardiovascular: HRRR, no MGR, no LE edema  GI: S, NT, ND, +BS  Skin/Integumen: warm/dry  Other:  No focal motor/sensory deficits    Medications   Current Facility-Administered Medications   Medication Dose Route Frequency Provider Last Rate Last Admin     Current Facility-Administered Medications   Medication Dose Route Frequency Provider Last Rate Last Admin    aspirin (ASA) chewable tablet 81 mg  81 mg Oral Daily Brittany Martin MD   81 mg at 09/14/24 0930    atorvastatin (LIPITOR) tablet 40 mg  40 mg Oral Daily Brittany Martin MD   40 mg at 09/14/24 0931    buPROPion (WELLBUTRIN XL) 24 hr tablet 150 mg  150 mg Oral QAM Brittany Martin MD   150 mg at 09/14/24 0931    cycloSPORINE (RESTASIS) 0.05 % ophthalmic emulsion 1 drop  1 drop Both Eyes BID White, Aura Dalia, DO        fluticasone-vilanterol (BREO ELLIPTA) 100-25 MCG/ACT inhaler 1 puff  1 puff Inhalation Daily  Brittany Martin MD   1 puff at 09/14/24 0937    And    umeclidinium (INCRUSE ELLIPTA) 62.5 MCG/ACT inhaler 1 puff  1 puff Inhalation Daily Brittany Martin MD   1 puff at 09/14/24 0938    gabapentin (NEURONTIN) capsule 200 mg  200 mg Oral Daily at 4 pm Brittany Martin MD        gabapentin (NEURONTIN) capsule 600 mg  600 mg Oral BID Brittany Martin MD   600 mg at 09/14/24 0930    levothyroxine (SYNTHROID/LEVOTHROID) tablet 50 mcg  50 mcg Oral Daily Brittany Martin MD   50 mcg at 09/14/24 0930    sertraline (ZOLOFT) tablet 150 mg  150 mg Oral Daily Brittany Martin MD   150 mg at 09/14/24 0931    sodium chloride (PF) 0.9% PF flush 3 mL  3 mL Intracatheter Q8H Brittany Martin MD   3 mL at 09/14/24 0938       Data   Recent Labs   Lab 09/14/24  1119 09/14/24  0745 09/14/24  0223 09/13/24  1752   WBC  --   --   --  13.1*   HGB  --   --   --  10.9*   MCV  --   --   --  89   PLT  --   --   --  225   NA  --   --   --  131*   POTASSIUM  --   --   --  4.1   CHLORIDE  --   --   --  95*   CO2  --   --   --  24   BUN  --   --   --  12.7   CR  --   --   --  1.12*   ANIONGAP  --   --   --  12   FRANCINE  --   --   --  9.2   GLC 92 103* 101* 112*   ALBUMIN  --   --   --  4.3   PROTTOTAL  --   --   --  7.1   BILITOTAL  --   --   --  1.0   ALKPHOS  --   --   --  85   ALT  --   --   --  23   AST  --   --   --  25       Recent Results (from the past 24 hour(s))   Head CT w/o contrast    Narrative    EXAM: CT HEAD W/O CONTRAST, CTA HEAD NECK W CONTRAST  LOCATION: [facility]  DATE: 9/13/2024    INDICATION: Episode of lower extremity weakness, right leg heaviness, and slurred speech  COMPARISON: None.  CONTRAST: 67  ml Isovue 370  TECHNIQUE: Head and neck CT angiogram with IV contrast. Noncontrast head CT followed by axial helical CT images of the head and neck vessels obtained during the arterial phase of intravenous contrast administration. Axial 2D reconstructed images and    multiplanar 3D MIP reconstructed images of the head and neck vessels were performed by the technologist. Dose reduction techniques were used. All stenosis measurements made according to NASCET criteria unless otherwise specified.    FINDINGS:   NONCONTRAST HEAD CT:   INTRACRANIAL CONTENTS: No intracranial hemorrhage, extraaxial collection, or mass effect.  No CT evidence of acute infarct. Moderate presumed chronic small vessel ischemic changes. Moderate generalized volume loss. No hydrocephalus.     VISUALIZED ORBITS/SINUSES/MASTOIDS: No intraorbital abnormality. No paranasal sinus mucosal disease. No middle ear or mastoid effusion.    BONES/SOFT TISSUES: No acute abnormality.    HEAD CTA:  ANTERIOR CIRCULATION: Mild scattered atherosclerosis of the bilateral carotid siphons, not resulting in significant stenosis. No stenosis/occlusion, aneurysm, or high flow vascular malformation. Standard [place] anatomy.    POSTERIOR CIRCULATION: No stenosis/occlusion, aneurysm, or high flow vascular malformation. Balanced vertebral arteries supply a normal basilar artery.     DURAL VENOUS SINUSES: Expected enhancement of the major dural venous sinuses.    NECK CTA:  RIGHT CAROTID: No measurable stenosis or dissection.    LEFT CAROTID: Minimal atherosclerosis of the left carotid bifurcation, not resulting in significant stenosis.    VERTEBRAL ARTERIES: Minimal atherosclerosis of the V4 segment left vertebral artery, not contributing to significant stenosis. No focal stenosis or dissection. Balanced vertebral arteries.    AORTIC ARCH: Classic aortic arch anatomy with no significant stenosis at the origin of the great vessels.    NONVASCULAR STRUCTURES: Visualized paravertebral soft tissues are grossly unremarkable. Visualized lung apices are clear. Multilevel cervical spondylosis.      Impression    IMPRESSION:   HEAD CT:  1.  No acute intracranial findings.  2.  Moderate presumed chronic microvascular ischemic changes  of the white matter.    HEAD CTA:   1.  No significant stenosis or occlusion of the [place] vasculature.    NECK CTA:  1.  No significant stenosis of the major arterial vasculature of the neck.   CTA Head Neck with Contrast    Narrative    EXAM: CT HEAD W/O CONTRAST, CTA HEAD NECK W CONTRAST  LOCATION: [facility]  DATE: 9/13/2024    INDICATION: Episode of lower extremity weakness, right leg heaviness, and slurred speech  COMPARISON: None.  CONTRAST: 67  ml Isovue 370  TECHNIQUE: Head and neck CT angiogram with IV contrast. Noncontrast head CT followed by axial helical CT images of the head and neck vessels obtained during the arterial phase of intravenous contrast administration. Axial 2D reconstructed images and   multiplanar 3D MIP reconstructed images of the head and neck vessels were performed by the technologist. Dose reduction techniques were used. All stenosis measurements made according to NASCET criteria unless otherwise specified.    FINDINGS:   NONCONTRAST HEAD CT:   INTRACRANIAL CONTENTS: No intracranial hemorrhage, extraaxial collection, or mass effect.  No CT evidence of acute infarct. Moderate presumed chronic small vessel ischemic changes. Moderate generalized volume loss. No hydrocephalus.     VISUALIZED ORBITS/SINUSES/MASTOIDS: No intraorbital abnormality. No paranasal sinus mucosal disease. No middle ear or mastoid effusion.    BONES/SOFT TISSUES: No acute abnormality.    HEAD CTA:  ANTERIOR CIRCULATION: Mild scattered atherosclerosis of the bilateral carotid siphons, not resulting in significant stenosis. No stenosis/occlusion, aneurysm, or high flow vascular malformation. Standard [place] anatomy.    POSTERIOR CIRCULATION: No stenosis/occlusion, aneurysm, or high flow vascular malformation. Balanced vertebral arteries supply a normal basilar artery.     DURAL VENOUS SINUSES: Expected enhancement of the major dural venous sinuses.    NECK CTA:  RIGHT  CAROTID: No measurable stenosis or dissection.    LEFT CAROTID: Minimal atherosclerosis of the left carotid bifurcation, not resulting in significant stenosis.    VERTEBRAL ARTERIES: Minimal atherosclerosis of the V4 segment left vertebral artery, not contributing to significant stenosis. No focal stenosis or dissection. Balanced vertebral arteries.    AORTIC ARCH: Classic aortic arch anatomy with no significant stenosis at the origin of the great vessels.    NONVASCULAR STRUCTURES: Visualized paravertebral soft tissues are grossly unremarkable. Visualized lung apices are clear. Multilevel cervical spondylosis.      Impression    IMPRESSION:   HEAD CT:  1.  No acute intracranial findings.  2.  Moderate presumed chronic microvascular ischemic changes of the white matter.    HEAD CTA:   1.  No significant stenosis or occlusion of the [place] vasculature.    NECK CTA:  1.  No significant stenosis of the major arterial vasculature of the neck.   MR Brain w/o Contrast    Narrative    EXAM: MR BRAIN W/O CONTRAST  LOCATION: [facility]  DATE: 9/13/2024    INDICATION: Bilateral lower extremity weakness, transient  COMPARISON: None.  TECHNIQUE: Routine multiplanar multisequence head MRI without intravenous contrast.    FINDINGS:  INTRACRANIAL CONTENTS: No acute or subacute infarct. No mass, acute hemorrhage, or extra-axial fluid collections. Confluent nonspecific T2/FLAIR hyperintensities within the cerebral white matter most consistent with advanced microvascular ischemic   change. Mild generalized cerebral atrophy. No hydrocephalus. Normal position of the cerebellar tonsils.     SELLA: No abnormality accounting for technique.    OSSEOUS STRUCTURES/SOFT TISSUES: Normal marrow signal. The major intracranial vascular flow voids are maintained.     ORBITS: No abnormality accounting for technique.     SINUSES/MASTOIDS: No paranasal sinus mucosal disease. No middle ear or mastoid effusion.        Impression    IMPRESSION:  1.  No acute intracranial process.  2.  Generalized brain atrophy and presumed microvascular ischemic changes as detailed above.   XR Chest 2 Views    Narrative    EXAM: XR CHEST 2 VIEWS  LOCATION: [facility]  DATE: 9/14/2024    INDICATION: leukocytosis, eval for pneumonia  COMPARISON: None.      Impression    IMPRESSION: No acute cardiopulmonary process.

## 2024-09-14 NOTE — PROVIDER NOTIFICATION
"MD Notification    Notified Person: MD    Notified Person Name: Dr. MartinBrittany     Notification Date/Time:    Notification Interaction: [employer] messaging     Purpose of Notification:  Short stay room 527.  Orthostatic   Lying HU=109/60, Pule 91.   Sitting BP= 129/62. Pulse= 88.   Standing BP=98/50. Pulse= 85  Pt asymptomatic.   IVF ns 500 ml bolus started.   Orders Received:    Comments: \"Please repeat orthostatic vitals tomorrow at 8am. Thanks\"  "

## 2024-09-14 NOTE — PROGRESS NOTES
List all goals to be met before discharge home      Free from ACUTE neuro deficits  Yes  Testing complete Not met, Echo and Neuro consult pending  Other:

## 2024-09-14 NOTE — PLAN OF CARE
Physical Therapy Discharge Summary    Reason for therapy discharge:    All goals and outcomes met, no further needs identified.    Progress towards therapy goal(s). See goals on Care Plan in [facility] electronic health record for goal details.  Goals met    Therapy recommendation(s):    Continued therapy is recommended.  Rationale/Recommendations:  Patient has had ongoing mild balance issues for several months. FWW ordered for home use and patient mobilizing with FWW and Mod I. Recommend HHPT to address balance to reduce risk for falls.

## 2024-09-14 NOTE — PROGRESS NOTES
Admission/Transfer from: Admission   2 RN skin assessment completed. Yes   Name of 2nd RN: Maria Isabel beltrán RN   Significant findings include: Scattered bruises to right forearm and left thigh   WO Nurse Consult Ordered? No

## 2024-09-14 NOTE — CONSULTS
Care Management Initial Consult    General Information  Assessment completed with: Shae Medellin  Type of CM/SW Visit: Initial Assessment    Primary Care Provider verified and updated as needed: Yes   Readmission within the last 30 days: no previous admission in last 30 days      Reason for Consult: discharge planning  Advance Care Planning: Advance Care Planning Reviewed: questions answered          Communication Assessment  Patient's communication style: spoken language (English or Bilingual)    Hearing Difficulty or Deaf: yes        Cognitive  Cognitive/Neuro/Behavioral: WDL  Level of Consciousness: alert  Arousal Level: opens eyes spontaneously  Orientation: oriented x 4  Mood/Behavior: calm, cooperative          Living Environment:   People in home: alone     Current living Arrangements: apartment, independent living facility      Able to return to prior arrangements: yes       Family/Social Support:  Care provided by: self, child(maciej)  Provides care for: no one  Marital Status: Single  Support system:            Description of Support System:   daughter        Current Resources:   Patient receiving home care services: No        Community Resources:none but lives at an [facility]    Equipment currently used at home: none  Supplies currently used at home:  none    Employment/Financial:  Employment Status: retired        Financial Concerns: none           Does the patient's insurance plan have a 3 day qualifying hospital stay waiver?  Yes     Which insurance plan 3 day waiver is available? Alternative insurance waiver    Will the waiver be used for post-acute placement? No    Lifestyle & Psychosocial Needs:  Social Determinants of Health     Food Insecurity: Not on file   Depression: Not at risk (8/10/2023)    Received from [facility]    PHQ-2     PHQ-2 Score: 1   Housing Stability: Not on file   Tobacco Use: Medium Risk (8/12/2024)    Received from [employer]    Patient History     Smoking Tobacco Use: Former  "    Smokeless Tobacco Use: Never     Passive Exposure: Not on file   Financial Resource Strain: Low Risk  (5/5/2022)    Received from [facility]    Overall Financial Resource Strain (CARDIA)     Difficulty of Paying Living Expenses: Not hard at all   Alcohol Use: Not on file   Transportation Needs: Not on file   Physical Activity: Inactive (5/5/2022)    Received from [facility]    Exercise Vital Sign     Days of Exercise per Week: 0 days     Minutes of Exercise per Session: 0 min   Interpersonal Safety: Not on file   Stress: No Stress Concern Present (5/5/2022)    Received from [facility]    [employer] - Occupational Stress Questionnaire     Feeling of Stress : Only a little   Social Connections: Not on file   Health Literacy: Not on file       Functional Status:  Prior to admission patient needed assistance:   Dependent ADLs:: Independent  Dependent IADLs:: Transportation       Mental Health Status:  Mental Health Status: No Current Concerns       Chemical Dependency Status:      No concerns          Values/Beliefs:  Spiritual, Cultural Beliefs, Yazidi Practices, Values that affect care:   no              Discussed  Partnership in Safe Discharge Planning  document with patient/family: Yes: patient    Additional Information:  Writer met with patient at bedside. Patient was admitted for transient LE weakness and slurred speech. Patient currently live at the [facility] and is in Independent Living.   -She has worked with PT and they recommend she have some home PT  -Writer introduced self and role. [place] is very [place] and does not have her hearing aides but with repeating things writer was able to communicate with her. Writer went over recommendation of home care. She is agreeable.\"I know some of the people there need the rehab and they come back and are not happy with it.\"  Writer sent referral for home PT.      Next Steps: home with home PT    Heather " JIMENA Bergman, RN

## 2024-09-14 NOTE — PROGRESS NOTES
RECEIVING UNIT ED HANDOFF REVIEW    ED Nurse Handoff Report was reviewed by: Ben Pérez RN on September 13, 2024 at 9:34 PM

## 2024-09-14 NOTE — ED NOTES
"[facility]  ED Nurse Handoff Report    ED Chief complaint: Extremity Weakness and Gait Problem      ED Diagnosis:   Final diagnoses:   Transient neurological symptoms   Slurred speech   Multiple falls       Code Status: To be addressed     Allergies: No Known Allergies    Patient Story: Patient presents to the ED via EMS for sudden onset weakness and slurred speech. Hx stroke   Focused Assessment:  Aox4, NIHSS 0,     Treatments and/or interventions provided: PIV, labs, imaging  Patient's response to treatments and/or interventions: stroke symptoms resolved prior to ED arrival, no change in status    To be done/followed up on inpatient unit: none    Does this patient have any cognitive concerns?:  None    Activity level - Baseline/Home:  Independent  Activity Level - Current:   Stand with Assist    Patient's Preferred language: English   Needed?: No    Isolation: None  Infection: Not Applicable  Patient tested for COVID 19 prior to admission: YES  Bariatric?: No    Vital Signs:   Vitals:    09/13/24 1725 09/13/24 1800 09/13/24 1905 09/13/24 2002   BP: (!) 141/78 102/61 113/64    Pulse: 89 85 81    Resp: 17 21 20    Temp: 100  F (37.8  C)   98.4  F (36.9  C)   TempSrc: Tympanic   Oral   SpO2: 95% 94% 94%    Weight: 63.5 kg (140 lb)      Height: 1.575 m (5' 2\")          Cardiac Rhythm:     Was the PSS-3 completed:   Yes  What interventions are required if any?               Family Comments: Daughter at bedside in ED      For the majority of the shift this patient's behavior was Green.   Behavioral interventions performed were n/a.    ED NURSE PHONE NUMBER: [phone]         "

## 2024-09-14 NOTE — PLAN OF CARE
Goal Outcome Evaluation:  PRIMARY Concern:  Lower extremity weakness and slurred speech.   SAFETY RISK Concerns (fall risk, behaviors, etc.): Fall risk   Isolation/Type: None   Tests/Procedures for NEXT shift: orthostatic vitals @8am and X-ray   Consults? (Pending/following, signed-off?) Neurology following. PT/OT, SW consult pending   Where is patient from? (Home, TCU, etc.): Home   Other Important info for NEXT shift: None   Anticipated DC date & active delays: TBD     SUMMARY NOTE:    Orientation/Cognitive: A&O X4   Observation Goals (Met/ Not Met): Not met   Mobility Level/Assist Equipment: SBA  Antibiotics & Plan (IV/po, length of tx left): None   Pain Management: Denies pain  Tele/VS/O2: VSS on RA except positive orthostatic BP.     ABNL Lab/BG: Sodium- 131. BG= 101    Diet: Regular   Bowel/Bladder: Incontinent at times   Skin Concerns: Scattered bruises   Drains/Devices: PIV SL  Patient Stated Goal for Today: Rest         Observation goals  PRIOR TO DISCHARGE       Comments: List all goals to be met before discharge home      Free from ACUTE neuro deficits- Met   Testing complete- Partially met

## 2024-09-14 NOTE — INTERIM SUMMARY
I was called regarding this patient who came with transient bilateral lower extremity weakness and slurred speech. CT CTA per my review no acute intracranial pathology, no LVO, mild bilateral ICA atherosclerosis. Agree with admission to obs and obtain MRI. Bilateral lower extremity weakness is atypical for a vascular event but continue her home meds including antithrombotics and statin.     Favian Phillips MD  Stroke fellow

## 2024-09-14 NOTE — PROGRESS NOTES
Observation goals  PRIOR TO DISCHARGE       Comments: List all goals to be met before discharge home      Free from ACUTE neuro deficits- Met   Testing complete- Partially met

## 2024-09-14 NOTE — PROGRESS NOTES
09/14/24 0966   Appointment Info   Signing Clinician's Name / Credentials (PT) Cely Lees, PT   Quick Adds   Quick Adds Certification   Living Environment   People in Home alone   Current Living Arrangements apartment   Home Accessibility no concerns   Transportation Anticipated family or friend will provide   Living Environment Comments Patient lives in [facility] in the independent living apartments.   Self-Care   Usual Activity Tolerance moderate   Current Activity Tolerance moderate   Regular Exercise No   Equipment Currently Used at Home none   Fall history within last six months yes   Number of times patient has fallen within last six months 6  (on day of admission)   Activity/Exercise/Self-Care Comment Patient is independent with all mobility and ADLs at baseline, but states she is not very active. On day of admission, patient had been out shopping when when she got home was falling. She has been feeling more off balance for the last 2-3 months but no other falls except episode of multiple falls that led to this hospitalization.   General Information   Onset of Illness/Injury or Date of Surgery 09/13/24   Referring Physician Brittany Martin MD   Patient/Family Therapy Goals Statement (PT) to go home   Pertinent History of Current Problem (include personal factors and/or comorbidities that impact the POC) Patient is 79 YO F who presented to hospital with transient B LE weakness and slurred speech. PMH includes  chronic back pain with radiation to left leg, COPD, GERD, depression, hypothyroidism, PFO.   Existing Precautions/Restrictions fall   General Observations Patient standing up in room with RN upon arrival to room, patient hard of hearing and hearing aids are not charged, agreeable to PT.   Cognition   Affect/Mental Status (Cognition) WFL   Orientation Status (Cognition) oriented x 4   Follows Commands (Cognition) follows one-step commands;follows two-step commands    Pain Assessment   Patient Currently in Pain No   Integumentary/Edema   Integumentary/Edema Comments Scattered small bruises on arms noted   Posture    Posture Forward head position;Protracted shoulders   Range of Motion (ROM)   Range of Motion ROM is WFL   ROM Comment Slight decrease in L ankle DF from history of ankle injuries/surgeries   Strength (Manual Muscle Testing)   Strength (Manual Muscle Testing) strength is WFL   Strength Comments B Hip flexion, knee extension, ankle PF and ankle DF symmetrical and 5/5   Transfers   Transfers sit-stand transfer   Sit-Stand Transfer   Sit-Stand Oliver (Transfers) supervision   Comment, (Sit-Stand Transfer) Mild unsteadiness upon standing up   Gait/Stairs (Locomotion)   Oliver Level (Gait) supervision   Distance in Feet (Gait) 25' during eval   Comment, (Gait/Stairs) Patient ambulated in room and hallway with no AD. SBA throughout due to lateral path deviations but no overt losses of balance.   Balance   Balance Comments Independent sitting balance; SBA for standing - trunk sway   Sensory Examination   Sensory Perception patient reports no sensory changes   Coordination   Coordination no deficits were identified   Coordination Comments B toe taps intact; B rapid  alternating hand intact   Muscle Tone   Muscle Tone no deficits were identified   Clinical Impression   Criteria for Skilled Therapeutic Intervention Yes, treatment indicated   PT Diagnosis (PT) Impaired mobility   Influenced by the following impairments Impaired balance   Functional limitations due to impairments Increased difficulty with functional transfers and ambulation   Clinical Presentation (PT Evaluation Complexity) stable   Clinical Presentation Rationale Medical status, clinical judgement, stable vital signs during PT   Clinical Decision Making (Complexity) low complexity   Planned Therapy Interventions (PT) gait training;patient/family education;transfer training;progressive  activity/exercise;balance training   Risk & Benefits of therapy have been explained evaluation/treatment results reviewed;care plan/treatment goals reviewed;risks/benefits reviewed;current/potential barriers reviewed;participants voiced agreement with care plan;participants included;patient   PT Total Evaluation Time   PT Eval, Low Complexity Minutes (50190) 10   Therapy Certification   Start of care date 09/14/24   Certification date from 09/14/24   Certification date to 09/14/24   Medical Diagnosis Transient neurological symptoms   Physical Therapy Goals   PT Frequency One time eval and treatment only   PT Predicted Duration/Target Date for Goal Attainment 09/14/24   PT Goals Transfers;Gait   PT: Transfers Modified independent;Sit to/from stand;Bed to/from chair;Assistive device;Goal Met  (FWW)   PT: Gait Modified independent;Rolling walker;100 feet;Goal Met   Interventions   Interventions Quick Adds Gait Training   Gait Training   Gait Training Minutes (63255) 24   Symptoms Noted During/After Treatment (Gait Training) fatigue   Treatment Detail/Skilled Intervention Patient ambulated ~ 350' with no AD and close SBA. Patient demonstrating lateral path deviations intermittently but able to self-correct balance. Discussed possisble trial of assistive device as she noted increasing balance issues over the last few months. Patient agreeable, stating she has been trying her neighbor's walker. Patient ambulated ~175' with FWW with SBA initially for cues to keep walker on the ground but quickly progressed to modified independent. Patient wanting to get a walker for home. Patient sitting up in chair at end of session. BP WNL. Chair alarm activated.   PT Discharge Planning   PT Plan Discharge from PT   PT Discharge Recommendation (DC Rec) home with home care physical therapy   PT Rationale for DC Rec Patient admitted with multiple falls due to transient B LE weakness and slurred speech. Patient's symptoms have resolved but  she has been having some decline in balance for the past 2-3 months. Patient instructed on use of FWW for mobility and progressed to demo modified independence, has been considering getting one prior to this admission. Patient safe to return home with FWW. Recommend HHPT to address balance deficits, no further acute PT needs at this time.   PT Brief overview of current status Mod I with FWW; Goals of therapy will be to address safe mobility and make recs for d/c to next level of care. Pt and RN will continue to follow all falls risk precautions as documented by RN staff while hospitalized.   PT Equipment Needed at Discharge walker, rolling  (DME order entered)   Total Session Time   Timed Code Treatment Minutes 24   Total Session Time (sum of timed and untimed services) 34     [facility]  OUTPATIENT PHYSICAL THERAPY EVALUATION  PLAN OF TREATMENT FOR OUTPATIENT REHABILITATION  (COMPLETE FOR INITIAL CLAIMS ONLY)  Patient's Last Name, First Name, M.I.  YOB: 1945  Shae Chinchilla                        Provider's Name  [facility] Medical Record No.  [id number]                             Onset Date:  09/13/24   Start of Care Date:  09/14/24   Type:     _X_PT   ___OT   ___SLP Medical Diagnosis:  Transient neurological symptoms              PT Diagnosis:  Impaired mobility Visits from SOC:  1     See note for plan of treatment, functional goals and certification details    I CERTIFY THE NEED FOR THESE SERVICES FURNISHED UNDER        THIS PLAN OF TREATMENT AND WHILE UNDER MY CARE     (Physician co-signature of this document indicates review and certification of the therapy plan).

## 2024-09-14 NOTE — H&P
[facility]    History and Physical - Hospitalist Service       Date of Admission:  9/13/2024    Assessment & Plan      Shae Chinchilla is a 78 year old female with past medical history of chronic back pain with radiation to left leg, COPD, GERD, depression, hypothyroidism is presenting for evaluation of lower extremity weakness and slurred speech. She is currently admitted under observation for further work up.      Transient b/l LE weakness and slurred speech.  *etiology could be possible TIA vs other. Reported sudden onset when both her leg felt heavy, felt unsteady. Also reported slurred speech that lasted 15 minutes. Symptoms resolved prior to arrival.   *also reported fall about a week when legs felt heavy,but no injury per patient.   *labs are notable for mild hyponatremia of 131, WBC 13.1, hgb 10.9.  UA not suggestive of infection.   *EKG normal sinus rhythm (not scanned in, but reviewed paper print by patient's room)  *CT head showed moderate presumed chronic microvascular ischemic changes of the white matter, but no acute intracranial findings.  *CTA head and neck without significant stenosis.  - MRI brain  - check hepatic function panel  - check electrolytes including phos and magnesium  - resume PTA ASA and Lipitor (lipid panel 8/12 24 LDL 55)  - PT/OT  - q4h neuro checks  - neuro consult    Hyponatremia  Na 131 on admission. Na 140 on 8/12 per review of care everywhere.  - will give limited total of 500cc overnight and follow BMP in AM    Leucocytosis  Patient at this time denies infectious symptoms including cough, shortness of breath, GI or  complaints.   - UA is negative for infection  - COVID19 negative  - will obtain CXR to complete work up  - follow up CBC in AM      Normocytic anemia:  *10.6- 11.8g/dL in 2023.   Iron studies were normal range in 6/2023  - hgb 10.9g/dL this admission  - recommend outpatient follow up    Chronic low back pain with radiation to left  "leg  - this is stable per patient report  - continue with PTA gabapentin    Hypothyroidism  -TSH 2.86 on 8/12/24  - continue with PTA levothyroxine    Depression  - continue with PTA Zoloft and Wellbutrin    COPD- continue with PTA prn albuterol    Severe coronary calcifications: on CT chest 3/23/23 (evaluated by cardiology on 6/6/23)- on ASA and satin    Hx of PFO,---> cardiology clinic note (6/6/23) not recommending PFO closure     Right caudate stroke--> Incidental finding on MRI obtained for work of memory complaint.. Previously seen by neurology 1/17/20 through [facility]  - on ASA and Statin       Hx of alcohol use disorder, quit in the 90s, in remission.  Former smoker   - congratulated pt               Diet:  regular diet  DVT Prophylaxis: Ambulate every shift  Hurtado Catheter: Not present  Lines: None     Cardiac Monitoring: None  Code Status:  full code, discussed with pateint    Clinically Significant Risk Factors Present on Admission                     # Anemia: based on hgb <11       # Overweight: Estimated body mass index is 25.61 kg/m  as calculated from the following:    Height as of this encounter: 1.575 m (5' 2\").    Weight as of this encounter: 63.5 kg (140 lb).                    Disposition Plan     Medically Ready for Discharge: Anticipated Tomorrow           Brittany Martin MD  Hospitalist Service  [facility]  Securely message with [employer] (more info)  Text page via [facility] Paging/Directory     ______________________________________________________________________    Chief Complaint   Slurred reproach and lower extremity weakness    History is obtained from the patient, chart review and discussion with the emergency room physician    History of Present Illness   Shae Chinchilla is a 78 year old female with past medical history of chronic back pain with radiation to left leg, COPD, GERD, depression, hypothyroidism is presenting for evaluation of lower extremity " weakness and slurred speech.   Patient reports that she was picking rocks for rock paint with her daughter when he legs suddenly felt heavy and felt like she could not control them. She states difficulty maintaining her balance. She is also noted to have slurred speech that lasted for about 15 minutes. Patient otherwise denies dizziness, headache, visual changes, or numbness/tingling sensation. Her symptoms were resolved when patent arrived to the ED    She also reports a fall about a week ago when her legs felt heavy, states no injury.     Patient otherwise denies cough, shortness of breath, chest pain, abdominal pain, diarrhea illness.  She has chronic low back pain with radiation to her left leg which she reports is unchanged.     In the ED, initial temperature was 100 trended down to 98.4 without intervention.  Heart rate is in the 80s.  Blood pressure 113/64.  Baseline thyroid panel shows sodium of 131, potassium 4.1, chloride of 95, creatinine of 1.12.  CBC shows WBC of 13.1, hemoglobin of 10.9 and platelets of 225.  SARS-CoV-2 is negative.  Urinalysis without evidence of infection.  CT head showed moderate presumed chronic microvascular ischemic changes of the white matter, but no acute intracranial findings.  CTA head and neck without significant stenosis.  Stroke neurology was contacted and recommended admission for observation and MRI for further evaluation.        Past Medical History    Chronic low back pain with radiation to left leg  Hypothyroidism  Depression  COPD  Severe coronary calcifications: on CT chest 3/23/23 (evaluated by cardiology on 6/6/23)- on ASA and satin  Hx of PFO,---> cardiology clinic note (6/6/23) not recommending PFO closure   Right caudate stroke--> Incidental finding on MRI obtained for work of memory complaint.. Previously seen by neurology 1/17-20 through [facility]  Hx of alcohol use disorder, quit in the 90s, in remission.  Former smoker     Past Surgical History   No past  surgical history on file.    Prior to Admission Medications   None        Review of Systems    The 10 point Review of Systems is negative other than noted in the HPI     Physical Exam   Vital Signs: Temp: 98.4  F (36.9  C) Temp src: Oral BP: 113/64 Pulse: 81   Resp: 20 SpO2: 94 % O2 Device: None (Room air)    Weight: 140 lbs 0 oz    General Appearance: alert, awake and no apparent distress  HEENT: NCAT, oral mucosa is moist, no pharyngeal erythema or exudate  Respiratory: clear to auscultation bilaterally  Cardiovascular: regular rate and rhythm  GI: soft and non-tender  Skin: warm and dry  Musculoskeletal: normal muscle tone  Neurologic: alert,oriented, strength symmetrical in b/l UE/LE, EOMI, pupils are equal and reactive, no pronator drift, gait not tested  Psychiatric: mood and affect are normal    Medical Decision Making       MANAGEMENT DISCUSSED with the following over the past 24 hours: patient   NOTE(S)/MEDICAL RECORDS REVIEWED over the past 24 hours: extensive chart review done in care everywhere  Tests ORDERED & REVIEWED in the past 24 hours:  - BMP  - CBC  - Urinalysis, COVID negative  Tests personally interpreted in the past 24 hours:      CT head and CTA head and neck report reviewed    Data     I have personally reviewed the following data over the past 24 hrs:    13.1 (H)  \   10.9 (L)   / 225     131 (L) 95 (L) 12.7 /  112 (H)   4.1 24 1.12 (H) \       Imaging results reviewed over the past 24 hrs:   No results found for this or any previous visit (from the past 24 hour(s)).

## 2024-09-14 NOTE — PHARMACY-ADMISSION MEDICATION HISTORY
Pharmacist Admission Medication History    Admission medication history is complete. The information provided in this note is only as accurate as the sources available at the time of the update.    Information Source(s): Patient and CareEverywhere/SureScripts via in-person    Pertinent Information: Patient has not taken any medications today.    Changes made to PTA medication list:  Added: all medications    Medication History Completed By: Shae Allen RPH 9/13/2024 8:41 PM    PTA Med List   Medication Sig Last Dose    albuterol (PROAIR HFA/PROVENTIL HFA/VENTOLIN HFA) 108 (90 Base) MCG/ACT inhaler Inhale 1-2 puffs into the lungs every 6 hours as needed for shortness of breath, wheezing or cough. as needed    alendronate (FOSAMAX) 70 MG tablet Take 70 mg by mouth every 7 days. 9/9/2024    aspirin (ASA) 81 MG chewable tablet Take 81 mg by mouth daily. 9/12/2024    atorvastatin (LIPITOR) 40 MG tablet Take 40 mg by mouth daily. 9/12/2024    buPROPion (WELLBUTRIN XL) 150 MG 24 hr tablet Take 150 mg by mouth every morning. 9/12/2024    cycloSPORINE (RESTASIS) 0.05 % ophthalmic emulsion Place 1 drop into both eyes 2 times daily. 9/12/2024    Fluticasone-Umeclidin-Vilant (TRELEGY ELLIPTA) 100-62.5-25 MCG/ACT oral inhaler Inhale 1 puff into the lungs daily. 9/12/2024    gabapentin (NEURONTIN) 300 MG capsule Take 600 mg by mouth 2 times daily. 9/12/2024    gabapentin (NEURONTIN) 300 MG capsule Take 300 mg by mouth daily at 4pm. 9/12/2024    levothyroxine (SYNTHROID/LEVOTHROID) 50 MCG tablet Take 50 mcg by mouth daily. 9/12/2024    omeprazole (PRILOSEC) 20 MG DR capsule Take 20 mg by mouth daily as needed. as needed    sertraline (ZOLOFT) 100 MG tablet Take 150 mg by mouth daily. 9/12/2024

## 2024-09-14 NOTE — PROGRESS NOTES
List all goals to be met before discharge home      Free from ACUTE neuro deficits  Yes  Testing complete Not met, Echo and Neuro consult pending  Other:         PRIMARY Concern:  Lower extremity weakness and slurred speech.   SAFETY RISK Concerns (fall risk, behaviors, etc.): Fall risk   Isolation/Type: None   Tests/Procedures for NEXT shift: Echo results pending, neuros intact  Consults? (Pending/following, signed-off?) Stroke neuro following  Where is patient from? (Home, TCU, etc.): ILF  [facility]   Other Important info for NEXT shift: [place]  Anticipated DC date & active delays: tomorrow back home with homecare PT     SUMMARY NOTE:     Orientation/Cognitive: A&O X4   Observation Goals (Met/ Not Met): Not met   Mobility Level/Assist Equipment: Ind  Antibiotics & Plan (IV/po, length of tx left): None   Pain Management: Denies pain  Tele/VS/O2: VSS on RA , tele was SR with 1st degree AVB  ABNL Lab/BG: see lab  Diet: Regular   Bowel/Bladder: Continent  Skin Concerns: Scattered bruises   Drains/Devices: PIV SLed  Patient Stated Goal for Today: to go home

## 2024-09-14 NOTE — PROGRESS NOTES
Observation goals  PRIOR TO DISCHARGE        Comments: List all goals to be met before discharge home      Free from ACUTE neuro deficits  Met  Testing complete  Met  Other:  Nurse to notify provider when observation goals have been met and patient is ready for discharge.

## 2024-09-15 VITALS
OXYGEN SATURATION: 95 % | TEMPERATURE: 98.4 F | SYSTOLIC BLOOD PRESSURE: 111 MMHG | HEART RATE: 84 BPM | BODY MASS INDEX: 26.35 KG/M2 | RESPIRATION RATE: 18 BRPM | WEIGHT: 143.2 LBS | HEIGHT: 62 IN | DIASTOLIC BLOOD PRESSURE: 54 MMHG

## 2024-09-15 LAB — GLUCOSE BLDC GLUCOMTR-MCNC: 93 MG/DL (ref 70–99)

## 2024-09-15 PROCEDURE — 99239 HOSP IP/OBS DSCHRG MGMT >30: CPT | Performed by: INTERNAL MEDICINE

## 2024-09-15 PROCEDURE — 250N000013 HC RX MED GY IP 250 OP 250 PS 637: Performed by: INTERNAL MEDICINE

## 2024-09-15 PROCEDURE — G0378 HOSPITAL OBSERVATION PER HR: HCPCS

## 2024-09-15 PROCEDURE — 82962 GLUCOSE BLOOD TEST: CPT

## 2024-09-15 RX ADMIN — UMECLIDINIUM 1 PUFF: 62.5 AEROSOL, POWDER ORAL at 08:42

## 2024-09-15 RX ADMIN — SERTRALINE HYDROCHLORIDE 150 MG: 100 TABLET ORAL at 08:39

## 2024-09-15 RX ADMIN — ATORVASTATIN CALCIUM 40 MG: 40 TABLET, FILM COATED ORAL at 08:39

## 2024-09-15 RX ADMIN — ASPIRIN 81 MG CHEWABLE TABLET 81 MG: 81 TABLET CHEWABLE at 08:38

## 2024-09-15 RX ADMIN — LEVOTHYROXINE SODIUM 50 MCG: 50 TABLET ORAL at 08:39

## 2024-09-15 RX ADMIN — BUPROPION HYDROCHLORIDE 150 MG: 150 TABLET, EXTENDED RELEASE ORAL at 08:39

## 2024-09-15 RX ADMIN — FLUTICASONE FUROATE AND VILANTEROL TRIFENATATE 1 PUFF: 100; 25 POWDER RESPIRATORY (INHALATION) at 08:42

## 2024-09-15 RX ADMIN — GABAPENTIN 600 MG: 300 CAPSULE ORAL at 08:38

## 2024-09-15 ASSESSMENT — ACTIVITIES OF DAILY LIVING (ADL)
ADLS_ACUITY_SCORE: 33

## 2024-09-15 NOTE — PROGRESS NOTES
Observation goals  PRIOR TO DISCHARGE       Comments: List all goals to be met before discharge home      Free from ACUTE neuro deficits- Met   Testing complete- Met

## 2024-09-15 NOTE — PLAN OF CARE
Goal Outcome Evaluation:       Patient has been discharged September 15, 2024 10:33 AM. Discharge instructions and education reviewed, medication schedule and follow up appts discussed. Pt had no questions. All belongings sent with pt.

## 2024-09-15 NOTE — PLAN OF CARE
Goal Outcome Evaluation:  PRIMARY Concern: Lower extremity weakness and slurred speech.   SAFETY RISK Concerns (fall risk, behaviors, etc.): Fall risk   Isolation/Type: None   Tests/Procedures for NEXT shift: AM labs   Consults? (Pending/following, signed-off?) PT/OT, SW following. Stroke neuro signed off   Where is patient from? (Home, TCU, etc.): Home   Other Important info for NEXT shift: None   Anticipated DC date & active delays: today w/homecare PT     SUMMARY NOTE:     Orientation/Cognitive: A&O X4   Observation Goals (Met/ Not Met): Met  Mobility Level/Assist Equipment: SBA/IND  Antibiotics & Plan (IV/po, length of tx left): None   Pain Management: Denies pain  Tele/VS/O2: VSS on RA. Tele=SR  ABNL Lab/BG: BG= 93  Diet: Regular   Bowel/Bladder: Incontinent at times   Skin Concerns: Scattered bruises   Drains/Devices: PIV SL  Patient Stated Goal for Today: Rest         Observation goals  PRIOR TO DISCHARGE       Comments: List all goals to be met before discharge home      Free from ACUTE neuro deficits- Met   Testing complete- Met

## 2024-09-15 NOTE — PLAN OF CARE
Goal Outcome Evaluation:      Plan of Care Reviewed With: patient    Overall Patient Progress: improvingOverall Patient Progress: improving     PRIMARY Concern:  BLE weakness & slurred speech.   SAFETY RISK Concerns (fall risk, behaviors, etc.): Fall risk   Isolation/Type: None   Tests/Procedures for NEXT shift: AM labs-neuro's intact  Consults? (Pending/following, signed-off?) Stroke neuro signed off  Where is patient from? (Home, TCU, etc.): ILF  [facility]   Other Important info for NEXT shift: [place]  Anticipated DC date & active delays: tomorrow w/homecare PT     SUMMARY NOTE:     Orientation/Cognitive: A&Ox4   Observation Goals (Met/ Not Met): Met   Mobility Level/Assist Equipment: Ind  Antibiotics & Plan (IV/po, length of tx left): None   Pain Management: Denies pain  Tele/VS/O2: VSS on RA , Tele SR  ABNL Lab/BG: see results  Diet: Regular   Bowel/Bladder: Continent  Skin Concerns: Scattered bruises   Drains/Devices: PIV SL  Patient Stated Goal for Today: to go home                        Observation goals  PRIOR TO DISCHARGE        Comments: List all goals to be met before discharge home      Free from ACUTE neuro deficits  Met  Testing complete  Met  Other:  Nurse to notify provider when observation goals have been met and patient is ready for discharge.

## 2024-09-15 NOTE — PROGRESS NOTES
PRIMARY Concern: Lower extremity weakness and slurred speech.   SAFETY RISK Concerns (fall risk, behaviors, etc.): Fall risk   Isolation/Type: None   Tests/Procedures for NEXT shift: AM labs   Consults? (Pending/following, signed-off?) PT/OT, SW following. Stroke neuro signed off   Where is patient from? (Home, TCU, etc.): Home   Other Important info for NEXT shift: None   Anticipated DC date & active delays: today w/homecare PT     SUMMARY NOTE: Patient calm this morning, but eagerly waiting to go home. Waiting to see her provider, who already entered a discharge order. Speech clear and logical, no changes noted this morning.      Orientation/Cognitive: A&O X4   Observation Goals (Met/ Not Met): Met  Mobility Level/Assist Equipment: SBA/IND  Antibiotics & Plan (IV/po, length of tx left): None   Pain Management: Denies pain  Tele/VS/O2: VSS on RA. Tele=SR  ABNL Lab/BG: BG= 93  Diet: Regular  Bowel/Bladder: Incontinent at times   Skin Concerns: Scattered bruises   Drains/Devices: PIV SL  Patient Stated Goal for Today: Rest

## 2024-09-15 NOTE — DISCHARGE SUMMARY
[facility]    Discharge Summary  Hospitalist    Date of Admission:  9/13/2024  Date of Discharge:   9/15/2024  Discharging Provider: Aura Mcknight,     Discharge Diagnoses   Transient bilateral LE weakness and slurred speech, concern for TIA vs dehydration  Orthostasis: Resolved  Hyponatremia: Resolved  Leucocytosis of unclear etiology: Resolved  Severe coronary calcifications  Hx of PFO  Hx of R caudate stroke  Chronic normocytic anemia  Chronic low back pain with radiation to left leg  Hypothyroidism  Depression  COPD, not on baseline O2  Hx of tobacco use, in remission  Hx of alcohol use disorder, quit in the 90s, in remission    History of Present Illness   Shae Chinchilla is a 78 year old female with PMHx of  chronic back pain with radiation to left leg, COPD, GERD, depression and hypothyroidism who was admitted under observation status on 9/13/2024 for evaluation of LLE weakness and slurred speech.     Hospital Course   Shae Chinchilla was admitted on 9/13/2024.  The following problems were addressed during her hospitalization:    Transient bilateral LE weakness and slurred speech, concern for TIA vs dehydration  *On the day of admission, patient reported she was picking rocks with her daughter to pain when she developed sudden onset heaviness in her legs and felt as though she could not control them. This made balancing difficult. Also noted around this time to have a 15 min episode of slurred speech. No dizziness, headache, vision changes, numbness or tingling. Presented to ED for evaluation, however her symptoms had resolved by the time she arrived. Later reported an episode of leg heaviness 1 wk prior that led to a fall, no injury. Chronic low back pain with radiation to her LLE was unchanged.   *In the ED, she was afebrile and VSS. Presentation concerning for possible TIA. Labs notable for WBC 31.1, hgb 10.9, Na 131, . Mag, phos and LFTs okay. UA not  suggestive of UTI. CXR neg for infiltrate. EKG showed NSR. Head CT was neg for acute pathology, CTA head/neck neg for significant stenosis. Admitted to observation for further evaluation.   *MRI brain was neg for acute intracranial process.   *Question if her presentation could have been due in part to dehydration? Noted +orthostatic VS on admission which normalized after IVFs.  *Echo this stay showed EF 60-65%, nl RVSF, no valve disease, no intra-atrial shunt  *Seen by stroke team, recommended to cont PTA ASA and statin (FLP on 8/12/24 with HDL 49, LDL 55)  *Responded well to IVFs.   *PT recommended discharge home with home PT. Follow up with PCP.     Orthostasis: Resolved  *Orthostatic VS +on night of admission. Given IVFs and repeat orthostatics were neg.     Hyponatremia: Resolved  *Na 131 on admission, had been 140 on 8/12 per review of care everywhere.  *IVFs started on admission. Na improved    Leucocytosis of unclear etiology: Resolved  *WBC 13 on admission. Afebrile and VSS. No s/sx of localized infection. COVID swab neg. CXR neg. UA not suggestive of UTI. Possibly reactive dt above?  *WBC normalized without specific intervention.    Severe coronary calcifications  *Seen on CT chest 3/23/23 (evaluated by cardiology on 6/6/23), on ASA and satin    Hx of PFO  *Per cardiology clinic note from 6/6/23, did not recommend PFO closure     Hx of R caudate stroke  *Prior incidental finding on MRI obtained for work of memory complaint.. Previously seen by neurology 1/17/20 through [facility]. Chronic and stable on ASA, statin.    Chronic normocytic anemia  *Hgb stable at baseline of 10-11. Iron studies were nl in 2023.     Chronic low back pain with radiation to left leg  *No recent change in nature of symptoms. Chronic and stable on gabapentin.     Hypothyroidism  *TSH 2.86 on 8/12/24. Chronic and stable on levothyroxine.    Depression  *Chronic and stable on Zoloft and Wellbutrin    COPD, not on baseline O2  Hx of  tobacco use, in remission  *Chronic and stable on prn albuterol. No s/sx of exacerbation    Hx of alcohol use disorder, quit in the 90s, in remission    Aura Mcknight DO    Code Status   Full Code       Primary Care Physician   Renetta Celaya    Physical Exam   Temp: 99.1  F (37.3  C) Temp src: Oral BP: 119/47 Pulse: 79   Resp: 18 SpO2: 94 % O2 Device: None (Room air)    Vitals:    09/13/24 1725 09/13/24 2325 09/15/24 0600   Weight: 63.5 kg (140 lb) 65.6 kg (144 lb 11.2 oz) 65 kg (143 lb 3.2 oz)     Vital Signs with Ranges  Temp:  [98.8  F (37.1  C)-99.2  F (37.3  C)] 99.1  F (37.3  C)  Pulse:  [79-87] 79  Resp:  [16-18] 18  BP: ()/(47-64) 119/47  SpO2:  [94 %-95 %] 94 %  I/O last 3 completed shifts:  In: 100 [P.O.:100]  Out: -     General: Resting comfortably, answering questions appropriately, NAD  CVS: HRRR, no MGR, no LE edema  Respiratory: CTAB, no wheeze, no increased work of breathing  GI: S, NT, ND, +BS  Skin: Warm/dry  Neuro: No focal motor/sensory deficits    Discharge Disposition   Discharged to home  Condition at discharge: Stable    Consultations This Hospital Stay   NEUROLOGY IP STROKE CONSULT  OCCUPATIONAL THERAPY ADULT IP CONSULT  PHYSICAL THERAPY ADULT IP CONSULT  CARE MANAGEMENT / SOCIAL WORK IP CONSULT    Time Spent on this Encounter   IAura DO, personally saw the patient today and spent greater than 30 minutes discharging this patient.    Discharge Orders      Home Care Referral      Reason for your hospital stay    Evaluation of your transient slurred speech and leg heaviness, which was concerning for a possible mini stroke.  Your head imaging did not show any findings of acute stroke.  It was suspected you could have had a mini stroke (transient ischemic attack or TIA) which does not show any findings on head imaging.  You were evaluated by the neurology service this day.     Activity    Your activity upon discharge: activity as tolerated     Follow-up and  recommended labs and tests     Follow-up with your PCP in the next 1 to 2 weeks with routine labs.  Follow-up with your other providers as scheduled or as needed.     Diet    Follow this diet upon discharge: Regular     Discharge Medications   Current Discharge Medication List        CONTINUE these medications which have NOT CHANGED    Details   albuterol (PROAIR HFA/PROVENTIL HFA/VENTOLIN HFA) 108 (90 Base) MCG/ACT inhaler Inhale 1-2 puffs into the lungs every 6 hours as needed for shortness of breath, wheezing or cough.      alendronate (FOSAMAX) 70 MG tablet Take 70 mg by mouth every 7 days.      aspirin (ASA) 81 MG chewable tablet Take 81 mg by mouth daily.      atorvastatin (LIPITOR) 40 MG tablet Take 40 mg by mouth daily.      buPROPion (WELLBUTRIN XL) 150 MG 24 hr tablet Take 150 mg by mouth every morning.      cycloSPORINE (RESTASIS) 0.05 % ophthalmic emulsion Place 1 drop into both eyes 2 times daily.      Fluticasone-Umeclidin-Vilant (TRELEGY ELLIPTA) 100-62.5-25 MCG/ACT oral inhaler Inhale 1 puff into the lungs daily.      !! gabapentin (NEURONTIN) 300 MG capsule Take 600 mg by mouth 2 times daily.      !! gabapentin (NEURONTIN) 300 MG capsule Take 300 mg by mouth daily at 4pm.      levothyroxine (SYNTHROID/LEVOTHROID) 50 MCG tablet Take 50 mcg by mouth daily.      omeprazole (PRILOSEC) 20 MG DR capsule Take 20 mg by mouth daily as needed.      sertraline (ZOLOFT) 100 MG tablet Take 150 mg by mouth daily.       !! - Potential duplicate medications found. Please discuss with provider.        Allergies   No Known Allergies  Data   Results for orders placed or performed during the hospital encounter of 09/13/24   CTA Head Neck with Contrast    Narrative    EXAM: CT HEAD W/O CONTRAST, CTA HEAD NECK W CONTRAST  LOCATION: [facility]  DATE: 9/13/2024    INDICATION: Episode of lower extremity weakness, right leg heaviness, and slurred speech  COMPARISON: None.  CONTRAST: 67  ml Isovue  370  TECHNIQUE: Head and neck CT angiogram with IV contrast. Noncontrast head CT followed by axial helical CT images of the head and neck vessels obtained during the arterial phase of intravenous contrast administration. Axial 2D reconstructed images and   multiplanar 3D MIP reconstructed images of the head and neck vessels were performed by the technologist. Dose reduction techniques were used. All stenosis measurements made according to NASCET criteria unless otherwise specified.    FINDINGS:   NONCONTRAST HEAD CT:   INTRACRANIAL CONTENTS: No intracranial hemorrhage, extraaxial collection, or mass effect.  No CT evidence of acute infarct. Moderate presumed chronic small vessel ischemic changes. Moderate generalized volume loss. No hydrocephalus.     VISUALIZED ORBITS/SINUSES/MASTOIDS: No intraorbital abnormality. No paranasal sinus mucosal disease. No middle ear or mastoid effusion.    BONES/SOFT TISSUES: No acute abnormality.    HEAD CTA:  ANTERIOR CIRCULATION: Mild scattered atherosclerosis of the bilateral carotid siphons, not resulting in significant stenosis. No stenosis/occlusion, aneurysm, or high flow vascular malformation. Standard [place] anatomy.    POSTERIOR CIRCULATION: No stenosis/occlusion, aneurysm, or high flow vascular malformation. Balanced vertebral arteries supply a normal basilar artery.     DURAL VENOUS SINUSES: Expected enhancement of the major dural venous sinuses.    NECK CTA:  RIGHT CAROTID: No measurable stenosis or dissection.    LEFT CAROTID: Minimal atherosclerosis of the left carotid bifurcation, not resulting in significant stenosis.    VERTEBRAL ARTERIES: Minimal atherosclerosis of the V4 segment left vertebral artery, not contributing to significant stenosis. No focal stenosis or dissection. Balanced vertebral arteries.    AORTIC ARCH: Classic aortic arch anatomy with no significant stenosis at the origin of the great vessels.    NONVASCULAR STRUCTURES: Visualized  paravertebral soft tissues are grossly unremarkable. Visualized lung apices are clear. Multilevel cervical spondylosis.      Impression    IMPRESSION:   HEAD CT:  1.  No acute intracranial findings.  2.  Moderate presumed chronic microvascular ischemic changes of the white matter.    HEAD CTA:   1.  No significant stenosis or occlusion of the [place] vasculature.    NECK CTA:  1.  No significant stenosis of the major arterial vasculature of the neck.   Head CT w/o contrast    Narrative    EXAM: CT HEAD W/O CONTRAST, CTA HEAD NECK W CONTRAST  LOCATION: [facility]  DATE: 9/13/2024    INDICATION: Episode of lower extremity weakness, right leg heaviness, and slurred speech  COMPARISON: None.  CONTRAST: 67  ml Isovue 370  TECHNIQUE: Head and neck CT angiogram with IV contrast. Noncontrast head CT followed by axial helical CT images of the head and neck vessels obtained during the arterial phase of intravenous contrast administration. Axial 2D reconstructed images and   multiplanar 3D MIP reconstructed images of the head and neck vessels were performed by the technologist. Dose reduction techniques were used. All stenosis measurements made according to NASCET criteria unless otherwise specified.    FINDINGS:   NONCONTRAST HEAD CT:   INTRACRANIAL CONTENTS: No intracranial hemorrhage, extraaxial collection, or mass effect.  No CT evidence of acute infarct. Moderate presumed chronic small vessel ischemic changes. Moderate generalized volume loss. No hydrocephalus.     VISUALIZED ORBITS/SINUSES/MASTOIDS: No intraorbital abnormality. No paranasal sinus mucosal disease. No middle ear or mastoid effusion.    BONES/SOFT TISSUES: No acute abnormality.    HEAD CTA:  ANTERIOR CIRCULATION: Mild scattered atherosclerosis of the bilateral carotid siphons, not resulting in significant stenosis. No stenosis/occlusion, aneurysm, or high flow vascular malformation. Standard [place]  anatomy.    POSTERIOR CIRCULATION: No stenosis/occlusion, aneurysm, or high flow vascular malformation. Balanced vertebral arteries supply a normal basilar artery.     DURAL VENOUS SINUSES: Expected enhancement of the major dural venous sinuses.    NECK CTA:  RIGHT CAROTID: No measurable stenosis or dissection.    LEFT CAROTID: Minimal atherosclerosis of the left carotid bifurcation, not resulting in significant stenosis.    VERTEBRAL ARTERIES: Minimal atherosclerosis of the V4 segment left vertebral artery, not contributing to significant stenosis. No focal stenosis or dissection. Balanced vertebral arteries.    AORTIC ARCH: Classic aortic arch anatomy with no significant stenosis at the origin of the great vessels.    NONVASCULAR STRUCTURES: Visualized paravertebral soft tissues are grossly unremarkable. Visualized lung apices are clear. Multilevel cervical spondylosis.      Impression    IMPRESSION:   HEAD CT:  1.  No acute intracranial findings.  2.  Moderate presumed chronic microvascular ischemic changes of the white matter.    HEAD CTA:   1.  No significant stenosis or occlusion of the [place] vasculature.    NECK CTA:  1.  No significant stenosis of the major arterial vasculature of the neck.   MR Brain w/o Contrast    Narrative    EXAM: MR BRAIN W/O CONTRAST  LOCATION: [facility]  DATE: 9/13/2024    INDICATION: Bilateral lower extremity weakness, transient  COMPARISON: None.  TECHNIQUE: Routine multiplanar multisequence head MRI without intravenous contrast.    FINDINGS:  INTRACRANIAL CONTENTS: No acute or subacute infarct. No mass, acute hemorrhage, or extra-axial fluid collections. Confluent nonspecific T2/FLAIR hyperintensities within the cerebral white matter most consistent with advanced microvascular ischemic   change. Mild generalized cerebral atrophy. No hydrocephalus. Normal position of the cerebellar tonsils.     SELLA: No abnormality accounting for  technique.    OSSEOUS STRUCTURES/SOFT TISSUES: Normal marrow signal. The major intracranial vascular flow voids are maintained.     ORBITS: No abnormality accounting for technique.     SINUSES/MASTOIDS: No paranasal sinus mucosal disease. No middle ear or mastoid effusion.       Impression    IMPRESSION:  1.  No acute intracranial process.  2.  Generalized brain atrophy and presumed microvascular ischemic changes as detailed above.   XR Chest 2 Views    Narrative    EXAM: XR CHEST 2 VIEWS  LOCATION: [facility]  DATE: 2024    INDICATION: leukocytosis, eval for pneumonia  COMPARISON: None.      Impression    IMPRESSION: No acute cardiopulmonary process.   Echocardiogram Complete     Value    LVEF  60-65%    Narrative    [id number]  [id number]  [id number]  [id number]^JESUS^FRANCISCO^RAIANA     [facility]  Echocardiography Laboratory  [address] [place] [address]     Name: CHINO PEÑA  MRN: [id number]  : 1945  Study Date: 2024 01:36 PM  Age: 78 yrs  Gender: Female  Patient Location: [facility]  Reason For Study: TIA  Ordering Physician: FRANCISCO LEE  Referring Physician: Renetta Celaya  Performed By: Joseph Wetzel     BSA: 1.7 m2  Height: 62 in  Weight: 144 lb  HR: 83  BP: 104/55 mmHg  ______________________________________________________________________________  Procedure  Complete Portable Echo Adult. Optison (NDC #1335-9165) given intravenously.  ______________________________________________________________________________  Interpretation Summary     Left ventricular size, global systolic function, and wall motion are normal,  estimated LVEF 60-65%.  Right ventricular global function is normal.  No significant valvular abnormalities.  The inferior vena cava was normal in size with preserved respiratory  variability.     There are no prior studies available for  comparison.  ______________________________________________________________________________  Left Ventricle  The left ventricle is normal in size. There is normal left ventricular wall  thickness. Left ventricular systolic function is normal. The visual ejection  fraction is 60-65%. Left ventricular diastolic function is normal. No regional  wall motion abnormalities noted.     Right Ventricle  The right ventricle is normal in size and function.     Atria  Normal left atrial size. Right atrial size is normal. There is no color  Doppler evidence of an atrial shunt.     Mitral Valve  There is mild mitral annular calcification. There is trace mitral  regurgitation.     Tricuspid Valve  There is trace tricuspid regurgitation. The right ventricular systolic  pressure is approximated at 29.2 mmHg plus the right atrial pressure.     Aortic Valve  There is trivial trileaflet aortic sclerosis.     Pulmonic Valve  The pulmonic valve is not well seen, but is grossly normal.     Vessels  The aortic root is normal size. Normal size ascending aorta. The inferior vena  cava was normal in size with preserved respiratory variability.     Pericardium  There is no pericardial effusion.     ______________________________________________________________________________  MMode/2D Measurements & Calculations  IVSd: 0.82 cm  LVIDd: 3.8 cm  LVIDs: 2.5 cm  LVPWd: 0.88 cm  FS: 34.1 %  LV mass(C)d: 93.5 grams  LV mass(C)dI: 56.2 grams/m2     Ao root diam: 2.9 cm  LA dimension: 3.5 cm  asc Aorta Diam: 2.9 cm  LA/Ao: 1.2  LVOT diam: 2.0 cm  LVOT area: 3.2 cm2  Ao root diam index Ht(cm/m): 1.8  Ao root diam index BSA (cm/m2): 1.7  Asc Ao diam index BSA (cm/m2): 1.8  Asc Ao diam index Ht(cm/m): 1.8  LA Volume (BP): 44.1 ml  LA Volume Index (BP): 26.6 ml/m2  RWT: 0.46     TAPSE: 2.1 cm     Doppler Measurements & Calculations  MV E max joshua: 82.3 cm/sec  MV A max joshua: 102.4 cm/sec  MV E/A: 0.80  MV dec slope: 259.6 cm/sec2  MV dec time: 0.32 sec  PA  acc time: 0.15 sec  TR max roger: 270.1 cm/sec  TR max P.2 mmHg  E/E' av.9  Lateral E/e': 8.1  Medial E/e': 11.6  RV S Roger: 11.4 cm/sec     ______________________________________________________________________________  Report approved by: Christian Hinson 2024 03:38 PM           Most Recent 3 CBC's:  Recent Labs   Lab Test 24  12224  1752   WBC 9.8 13.1*   HGB 11.1* 10.9*   MCV 91 89    225     Most Recent 3 BMP's:  Recent Labs   Lab Test 09/15/24  0217 24  1851 249 24  0223 24  1752   NA  --   --  142  --  131*   POTASSIUM  --   --  4.1  --  4.1   CHLORIDE  --   --  106  --  95*   CO2  --   --  26  --  24   BUN  --   --  12.2  --  12.7   CR  --   --  1.02*  --  1.12*   ANIONGAP  --   --  10  --  12   FRANCINE  --   --  8.8  --  9.2   GLC 93 99 109*   < > 112*    < > = values in this interval not displayed.     Most Recent 2 LFT's:  Recent Labs   Lab Test 24  1752   AST 25   ALT 23   ALKPHOS 85   BILITOTAL 1.0

## 2024-09-15 NOTE — PLAN OF CARE
Occupational Therapy: Orders received. Chart reviewed and discussed with care team.? Occupational Therapy not indicated due to pt discharged.?Will complete orders.

## 2025-05-03 ENCOUNTER — APPOINTMENT (OUTPATIENT)
Dept: GENERAL RADIOLOGY | Facility: CLINIC | Age: 80
End: 2025-05-03
Attending: EMERGENCY MEDICINE
Payer: COMMERCIAL

## 2025-05-03 ENCOUNTER — HOSPITAL ENCOUNTER (INPATIENT)
Facility: CLINIC | Age: 80
LOS: 3 days | Discharge: HOME | End: 2025-05-07
Attending: EMERGENCY MEDICINE | Admitting: INTERNAL MEDICINE
Payer: COMMERCIAL

## 2025-05-03 DIAGNOSIS — L03.114 CELLULITIS OF LEFT HAND: ICD-10-CM

## 2025-05-03 LAB
ANION GAP SERPL CALCULATED.3IONS-SCNC: 13 MMOL/L (ref 7–15)
BASOPHILS # BLD AUTO: 0.1 10E3/UL (ref 0–0.2)
BASOPHILS NFR BLD AUTO: 1 %
BUN SERPL-MCNC: 8.9 MG/DL (ref 8–23)
CALCIUM SERPL-MCNC: 9.3 MG/DL (ref 8.8–10.4)
CHLORIDE SERPL-SCNC: 101 MMOL/L (ref 98–107)
CREAT SERPL-MCNC: 0.97 MG/DL (ref 0.51–0.95)
CRP SERPL-MCNC: 32.95 MG/L
EGFRCR SERPLBLD CKD-EPI 2021: 60 ML/MIN/1.73M2
EOSINOPHIL # BLD AUTO: 0.1 10E3/UL (ref 0–0.7)
EOSINOPHIL NFR BLD AUTO: 1 %
ERYTHROCYTE [DISTWIDTH] IN BLOOD BY AUTOMATED COUNT: 13.6 % (ref 10–15)
ERYTHROCYTE [SEDIMENTATION RATE] IN BLOOD BY WESTERGREN METHOD: 24 MM/HR (ref 0–30)
GLUCOSE SERPL-MCNC: 116 MG/DL (ref 70–99)
HCO3 SERPL-SCNC: 26 MMOL/L (ref 22–29)
HCT VFR BLD AUTO: 36.9 % (ref 35–47)
HGB BLD-MCNC: 12.1 G/DL (ref 11.7–15.7)
HOLD SPECIMEN: NORMAL
HOLD SPECIMEN: NORMAL
IMM GRANULOCYTES # BLD: 0.2 10E3/UL
IMM GRANULOCYTES NFR BLD: 2 %
LYMPHOCYTES # BLD AUTO: 2 10E3/UL (ref 0.8–5.3)
LYMPHOCYTES NFR BLD AUTO: 17 %
MCH RBC QN AUTO: 28.5 PG (ref 26.5–33)
MCHC RBC AUTO-ENTMCNC: 32.8 G/DL (ref 31.5–36.5)
MCV RBC AUTO: 87 FL (ref 78–100)
MONOCYTES # BLD AUTO: 1 10E3/UL (ref 0–1.3)
MONOCYTES NFR BLD AUTO: 9 %
NEUTROPHILS # BLD AUTO: 8.1 10E3/UL (ref 1.6–8.3)
NEUTROPHILS NFR BLD AUTO: 71 %
NRBC # BLD AUTO: 0 10E3/UL
NRBC BLD AUTO-RTO: 0 /100
PLATELET # BLD AUTO: 326 10E3/UL (ref 150–450)
POTASSIUM SERPL-SCNC: 3.8 MMOL/L (ref 3.4–5.3)
RBC # BLD AUTO: 4.24 10E6/UL (ref 3.8–5.2)
SODIUM SERPL-SCNC: 140 MMOL/L (ref 135–145)
WBC # BLD AUTO: 11.4 10E3/UL (ref 4–11)

## 2025-05-03 PROCEDURE — 80048 BASIC METABOLIC PNL TOTAL CA: CPT | Performed by: EMERGENCY MEDICINE

## 2025-05-03 PROCEDURE — 85025 COMPLETE CBC W/AUTO DIFF WBC: CPT | Performed by: EMERGENCY MEDICINE

## 2025-05-03 PROCEDURE — G0378 HOSPITAL OBSERVATION PER HR: HCPCS

## 2025-05-03 PROCEDURE — 73130 X-RAY EXAM OF HAND: CPT | Mod: LT

## 2025-05-03 PROCEDURE — 86140 C-REACTIVE PROTEIN: CPT | Performed by: EMERGENCY MEDICINE

## 2025-05-03 PROCEDURE — 96376 TX/PRO/DX INJ SAME DRUG ADON: CPT

## 2025-05-03 PROCEDURE — 250N000013 HC RX MED GY IP 250 OP 250 PS 637: Performed by: PHYSICIAN ASSISTANT

## 2025-05-03 PROCEDURE — 120N000001 HC R&B MED SURG/OB

## 2025-05-03 PROCEDURE — 96365 THER/PROPH/DIAG IV INF INIT: CPT

## 2025-05-03 PROCEDURE — 87040 BLOOD CULTURE FOR BACTERIA: CPT | Performed by: EMERGENCY MEDICINE

## 2025-05-03 PROCEDURE — 85652 RBC SED RATE AUTOMATED: CPT | Performed by: EMERGENCY MEDICINE

## 2025-05-03 PROCEDURE — 99222 1ST HOSP IP/OBS MODERATE 55: CPT | Performed by: PHYSICIAN ASSISTANT

## 2025-05-03 PROCEDURE — 36415 COLL VENOUS BLD VENIPUNCTURE: CPT | Performed by: EMERGENCY MEDICINE

## 2025-05-03 PROCEDURE — 250N000011 HC RX IP 250 OP 636: Performed by: PHYSICIAN ASSISTANT

## 2025-05-03 PROCEDURE — 250N000011 HC RX IP 250 OP 636: Performed by: EMERGENCY MEDICINE

## 2025-05-03 PROCEDURE — 99285 EMERGENCY DEPT VISIT HI MDM: CPT | Mod: 25

## 2025-05-03 RX ORDER — NALOXONE HYDROCHLORIDE 0.4 MG/ML
0.2 INJECTION, SOLUTION INTRAMUSCULAR; INTRAVENOUS; SUBCUTANEOUS
Status: DISCONTINUED | OUTPATIENT
Start: 2025-05-03 | End: 2025-05-07 | Stop reason: HOSPADM

## 2025-05-03 RX ORDER — ACETAMINOPHEN 325 MG/1
975 TABLET ORAL 3 TIMES DAILY
Status: DISCONTINUED | OUTPATIENT
Start: 2025-05-03 | End: 2025-05-07 | Stop reason: HOSPADM

## 2025-05-03 RX ORDER — NALOXONE HYDROCHLORIDE 0.4 MG/ML
0.4 INJECTION, SOLUTION INTRAMUSCULAR; INTRAVENOUS; SUBCUTANEOUS
Status: DISCONTINUED | OUTPATIENT
Start: 2025-05-03 | End: 2025-05-07 | Stop reason: HOSPADM

## 2025-05-03 RX ORDER — AMOXICILLIN 250 MG
1 CAPSULE ORAL 2 TIMES DAILY PRN
Status: DISCONTINUED | OUTPATIENT
Start: 2025-05-03 | End: 2025-05-07 | Stop reason: HOSPADM

## 2025-05-03 RX ORDER — ONDANSETRON 2 MG/ML
4 INJECTION INTRAMUSCULAR; INTRAVENOUS EVERY 6 HOURS PRN
Status: DISCONTINUED | OUTPATIENT
Start: 2025-05-03 | End: 2025-05-07 | Stop reason: HOSPADM

## 2025-05-03 RX ORDER — HYDRALAZINE HYDROCHLORIDE 10 MG/1
10 TABLET, FILM COATED ORAL EVERY 4 HOURS PRN
Status: DISCONTINUED | OUTPATIENT
Start: 2025-05-03 | End: 2025-05-07 | Stop reason: HOSPADM

## 2025-05-03 RX ORDER — FLUTICASONE FUROATE AND VILANTEROL 100; 25 UG/1; UG/1
1 POWDER RESPIRATORY (INHALATION) DAILY
Status: DISCONTINUED | OUTPATIENT
Start: 2025-05-03 | End: 2025-05-07 | Stop reason: HOSPADM

## 2025-05-03 RX ORDER — OXYCODONE HYDROCHLORIDE 5 MG/1
5 TABLET ORAL EVERY 4 HOURS PRN
Status: DISCONTINUED | OUTPATIENT
Start: 2025-05-03 | End: 2025-05-07 | Stop reason: HOSPADM

## 2025-05-03 RX ORDER — PANTOPRAZOLE SODIUM 20 MG/1
20 TABLET, DELAYED RELEASE ORAL DAILY PRN
Status: DISCONTINUED | OUTPATIENT
Start: 2025-05-03 | End: 2025-05-07 | Stop reason: HOSPADM

## 2025-05-03 RX ORDER — AMOXICILLIN 250 MG
2 CAPSULE ORAL 2 TIMES DAILY PRN
Status: DISCONTINUED | OUTPATIENT
Start: 2025-05-03 | End: 2025-05-07 | Stop reason: HOSPADM

## 2025-05-03 RX ORDER — BUPROPION HYDROCHLORIDE 150 MG/1
150 TABLET ORAL EVERY MORNING
Status: DISCONTINUED | OUTPATIENT
Start: 2025-05-04 | End: 2025-05-07 | Stop reason: HOSPADM

## 2025-05-03 RX ORDER — HYDROMORPHONE HCL IN WATER/PF 6 MG/30 ML
0.2 PATIENT CONTROLLED ANALGESIA SYRINGE INTRAVENOUS
Status: DISCONTINUED | OUTPATIENT
Start: 2025-05-03 | End: 2025-05-07 | Stop reason: HOSPADM

## 2025-05-03 RX ORDER — ASPIRIN 81 MG/1
81 TABLET, CHEWABLE ORAL DAILY
Status: DISCONTINUED | OUTPATIENT
Start: 2025-05-03 | End: 2025-05-07 | Stop reason: HOSPADM

## 2025-05-03 RX ORDER — ALBUTEROL SULFATE 90 UG/1
1-2 INHALANT RESPIRATORY (INHALATION) EVERY 6 HOURS PRN
Status: DISCONTINUED | OUTPATIENT
Start: 2025-05-03 | End: 2025-05-07 | Stop reason: HOSPADM

## 2025-05-03 RX ORDER — CEFAZOLIN SODIUM 1 G/3ML
1 INJECTION, POWDER, FOR SOLUTION INTRAMUSCULAR; INTRAVENOUS ONCE
Status: COMPLETED | OUTPATIENT
Start: 2025-05-03 | End: 2025-05-03

## 2025-05-03 RX ORDER — CEFAZOLIN SODIUM 1 G/3ML
1 INJECTION, POWDER, FOR SOLUTION INTRAMUSCULAR; INTRAVENOUS EVERY 8 HOURS
Status: DISCONTINUED | OUTPATIENT
Start: 2025-05-04 | End: 2025-05-07 | Stop reason: HOSPADM

## 2025-05-03 RX ORDER — HYDRALAZINE HYDROCHLORIDE 20 MG/ML
10 INJECTION INTRAMUSCULAR; INTRAVENOUS EVERY 4 HOURS PRN
Status: DISCONTINUED | OUTPATIENT
Start: 2025-05-03 | End: 2025-05-07 | Stop reason: HOSPADM

## 2025-05-03 RX ORDER — ONDANSETRON 4 MG/1
4 TABLET, ORALLY DISINTEGRATING ORAL EVERY 6 HOURS PRN
Status: DISCONTINUED | OUTPATIENT
Start: 2025-05-03 | End: 2025-05-07 | Stop reason: HOSPADM

## 2025-05-03 RX ORDER — GABAPENTIN 300 MG/1
600 CAPSULE ORAL 2 TIMES DAILY
Status: DISCONTINUED | OUTPATIENT
Start: 2025-05-03 | End: 2025-05-07 | Stop reason: HOSPADM

## 2025-05-03 RX ORDER — LEVOTHYROXINE SODIUM 50 UG/1
50 TABLET ORAL DAILY
Status: DISCONTINUED | OUTPATIENT
Start: 2025-05-03 | End: 2025-05-07 | Stop reason: HOSPADM

## 2025-05-03 RX ORDER — PROCHLORPERAZINE MALEATE 5 MG/1
5 TABLET ORAL EVERY 6 HOURS PRN
Status: DISCONTINUED | OUTPATIENT
Start: 2025-05-03 | End: 2025-05-07 | Stop reason: HOSPADM

## 2025-05-03 RX ORDER — HYDROMORPHONE HCL IN WATER/PF 6 MG/30 ML
0.4 PATIENT CONTROLLED ANALGESIA SYRINGE INTRAVENOUS
Status: DISCONTINUED | OUTPATIENT
Start: 2025-05-03 | End: 2025-05-07 | Stop reason: HOSPADM

## 2025-05-03 RX ORDER — ATORVASTATIN CALCIUM 40 MG/1
40 TABLET, FILM COATED ORAL DAILY
Status: DISCONTINUED | OUTPATIENT
Start: 2025-05-03 | End: 2025-05-07 | Stop reason: HOSPADM

## 2025-05-03 RX ORDER — GABAPENTIN 300 MG/1
300 CAPSULE ORAL
Status: DISCONTINUED | OUTPATIENT
Start: 2025-05-03 | End: 2025-05-07 | Stop reason: HOSPADM

## 2025-05-03 RX ADMIN — LEVOTHYROXINE SODIUM 50 MCG: 50 TABLET ORAL at 20:19

## 2025-05-03 RX ADMIN — GABAPENTIN 300 MG: 300 CAPSULE ORAL at 18:39

## 2025-05-03 RX ADMIN — GABAPENTIN 600 MG: 300 CAPSULE ORAL at 22:00

## 2025-05-03 RX ADMIN — ACETAMINOPHEN 975 MG: 325 TABLET, FILM COATED ORAL at 20:20

## 2025-05-03 RX ADMIN — ASPIRIN 81 MG CHEWABLE TABLET 81 MG: 81 TABLET CHEWABLE at 18:39

## 2025-05-03 RX ADMIN — CEFAZOLIN 1 G: 1 INJECTION, POWDER, FOR SOLUTION INTRAMUSCULAR; INTRAVENOUS at 23:12

## 2025-05-03 RX ADMIN — CEFAZOLIN 1 G: 1 INJECTION, POWDER, FOR SOLUTION INTRAMUSCULAR; INTRAVENOUS at 15:50

## 2025-05-03 RX ADMIN — SERTRALINE HYDROCHLORIDE 150 MG: 100 TABLET ORAL at 20:21

## 2025-05-03 RX ADMIN — ATORVASTATIN CALCIUM 40 MG: 40 TABLET, FILM COATED ORAL at 18:39

## 2025-05-03 ASSESSMENT — ACTIVITIES OF DAILY LIVING (ADL)
ADLS_ACUITY_SCORE: 42
ADLS_ACUITY_SCORE: 40
ADLS_ACUITY_SCORE: 39
ADLS_ACUITY_SCORE: 42
ADLS_ACUITY_SCORE: 42
ADLS_ACUITY_SCORE: 40
ADLS_ACUITY_SCORE: 42

## 2025-05-03 NOTE — H&P
"[facility]  History and Physical - Hospitalist Service       Date of Admission:  5/3/2025  PRIMARY CARE PROVIDER:    Renetta Celaya    Assessment & Plan   Shae Chinchilla is a 79 year old female with PMH of depression, hypothyroidism, chronic low back pain, COPD, HLD, hx CVA, hx substance abuse in remission, hx PFO who was admitted on 5/3/25 with LUE cellulitis.    Left hand cellulitis  *Presented with left hand pain, redness, warmth and swelling x 2 days. Last night, her thumb and thenar eminence was especially painful, prompting evaluation. No recent fall/trauma, no animal scratches or bites. Afebrile with WBC 11.4 and CRP 32. Xray w/ DJD, no fracture or erosive changes. Bedside US w/o abscess.   - Continue IV Ancef  - Orthopedic consult  - Repeat CBC and CRP tomorrow AM  - Keep RUE elevated  - Acetaminophen 975 mg q8h  - PO Oxycodone or IV Dilaudid PRN  - Blood cultures pending    Severe coronary calcifications  HLD  *Noted on CT chest 03/2023 and seen by cardiology 06/2023  - Continue PTA Aspirin and statin    History of PFO  *Per cardiology note from 6/6/23, did not recommend PFO closure    History of caudate stroke  *Prior incidental finding on MRI obtained for work of memory complaint. Previously seen by neurology 1/17/20 through [facility]. Chronic and stable on ASA, statin.     COPD  *Not currently in exacerbation  - Continue PTA inhalers    Hypothyroidism  - Continue PTA Synthroid    Chronic low back pain with radiation to LLE  - Continue PTA Gabapentin  - Acetaminophen PRN    Depression  - Continue PTA Zoloft and Wellbutrin    GERD  - Continue PTA PPI    Clinically Significant Risk Factors Present on Admission                 # Drug Induced Platelet Defect: home medication list includes an antiplatelet medication             # Overweight: Estimated body mass index is 25.61 kg/m  as calculated from the following:    Height as of this encounter: 1.575 m (5' 2\").    Weight as of " this encounter: 63.5 kg (140 lb).         # Financial/Environmental Concerns:              Diet:  regular  DVT Prophylaxis: Pneumatic Compression Devices  Hurtado Catheter: Not present  Lines: None     Cardiac Monitoring: None  Code Status:  full         Disposition Plan      Expected Discharge Date: 05/04/2025             Entered: Emi Cedillo PA-C 05/03/2025, 4:05 PM     Medically Ready for Discharge: Anticipated Tomorrow       The patient's care was discussed with the Attending Physician, Dr. Mcknight and Patient.    Emi Cedillo PA-C  [facility]  Securely message with the Vocera Web Console (learn more here)      ______________________________________________________________________    Chief Complaint   Hand pain    History is obtained from the patient and EMR.      History of Present Illness   Shae Chinchilla is a 79 year old female with PMH of depression, COPD, HLD, hx CVA, hx substance abuse, hx PFO who was admitted on 5/3/25 with LUE cellulitis.    Patient presented to the ED for evaluation of left hand pain. She states she has had left wrist pain for a couple days, but last night around 2000 her thumb pain started to get worse. She denies any recent injury or trauma. No history of any animal bites or scratches. She also notes she was diagnosed with diverticulitis 2 weeks ago and treated conservatively without antibiotics. She stopped taking her PTA at this time due to feeling generally unwell, and has not restarted. She does not have any further abdominal pain, nausea, vomiting, fevers, chills.    In the ED, afebrile with HR 81 and /91. WBC 11.4. CRP 32. Xray of the left hand showed evidence of DJD, no fracture or erosive changes. Bedside ultrasound showed no evidence of abscess. She was given IV Ancef in the ED.    Past Medical History    I have reviewed this patient's medical history and updated it with pertinent information if needed.   No past medical history on  file.    Prior to Admission Medications   Prior to Admission Medications   Prescriptions Last Dose Informant Patient Reported? Taking?   Fluticasone-Umeclidin-Vilant (TRELEGY ELLIPTA) 100-62.5-25 MCG/ACT oral inhaler 4/20/2025 Self Yes No   Sig: Inhale 1 puff into the lungs daily.   albuterol (PROAIR HFA/PROVENTIL HFA/VENTOLIN HFA) 108 (90 Base) MCG/ACT inhaler 4/20/2025 Self Yes Yes   Sig: Inhale 1-2 puffs into the lungs every 6 hours as needed for shortness of breath, wheezing or cough.   alendronate (FOSAMAX) 70 MG tablet 4/20/2025 Self Yes Yes   Sig: Take 70 mg by mouth every 7 days.   aspirin (ASA) 81 MG chewable tablet 4/20/2025 Self Yes No   Sig: Take 81 mg by mouth daily.   atorvastatin (LIPITOR) 40 MG tablet 4/20/2025 Self Yes No   Sig: Take 40 mg by mouth daily.   buPROPion (WELLBUTRIN XL) 150 MG 24 hr tablet 4/20/2025 Self Yes No   Sig: Take 150 mg by mouth every morning.   cycloSPORINE (RESTASIS) 0.05 % ophthalmic emulsion 4/20/2025 Self Yes No   Sig: Place 1 drop into both eyes 2 times daily.   gabapentin (NEURONTIN) 300 MG capsule 4/20/2025 Self Yes No   Sig: Take 600 mg by mouth 2 times daily.   gabapentin (NEURONTIN) 300 MG capsule 4/20/2025 Self Yes No   Sig: Take 300 mg by mouth daily at 4pm.   levothyroxine (SYNTHROID/LEVOTHROID) 50 MCG tablet 4/20/2025 Self Yes No   Sig: Take 50 mcg by mouth daily.   omeprazole (PRILOSEC) 20 MG DR capsule 4/20/2025 Self Yes No   Sig: Take 20 mg by mouth daily as needed.   sertraline (ZOLOFT) 100 MG tablet 4/20/2025 Self Yes No   Sig: Take 150 mg by mouth daily.      Facility-Administered Medications: None     Allergies   No Known Allergies    Physical Exam   Vital Signs: Temp: 98.1  F (36.7  C) Temp src: Oral BP: 129/83 Pulse: 83   Resp: 20 SpO2: 97 % O2 Device: None (Room air)    Weight: 140 lbs 0 oz    Constitutional: Awake, alert, cooperative, no apparent distress.    ENT: Normocephalic, without obvious abnormality, atraumatic. Normal sclera.    Neck: Supple,  symmetrical, trachea midline  Pulmonary: No increased work of breathing, good air exchange, clear to auscultation bilaterally  Cardiovascular: Regular rate and rhythm  GI: Normal bowel sounds, soft, non-distended, non-tender.   Skin: erythema and swelling especially of the left thenar eminence. She has limited ROM of the left thumb. Wrist ROM appears to be intact, with swelling noted in this area too. She has erythema tracking up the wrist proximally.          Neuro: Oriented x 3. Moves all extremities spontaneously. No focal neuro deficits.  Psych:  Normal affect and mood, forgetful  Extremities: Normal muscle tone. Calves non-tender b/l. No pitting edema b/l LE.    Medical Decision Making       60 MINUTES SPENT BY ME on the date of service doing chart review, history, exam, documentation & further activities per the note.         Data   Data reviewed today: I reviewed all medications, new labs and imaging results over the last 24 hours. I personally reviewed no images or EKG's today.      I have personally reviewed the following data over the past 24 hrs:    11.4 (H)  \   12.1   / 326     140 101 8.9 /  116 (H)   3.8 26 0.97 (H) \     Procal: N/A CRP: 32.95 (H) Lactic Acid: N/A         Imaging results reviewed over the past 24 hrs:   Recent Results (from the past 24 hours)   XR Hand Left G/E 3 Views    Narrative    EXAM: XR HAND LEFT G/E 3 VIEWS  LOCATION: [facility]  DATE: 5/3/2025    INDICATION: swelling about the thenar musculature   eval for foreign body, gas  COMPARISON: None.      Impression    IMPRESSION: Degenerative change at the STT joint and first CMC joint. Degenerative change first MCP joint. No evidence for fracture or dislocation. No erosive changes.

## 2025-05-03 NOTE — ED PROVIDER NOTES
"  Emergency Department Note      History of Present Illness     Chief Complaint   Hand Pain      HPI   Shae Chinchilla is a 79 year old right hand dominant female with a history of transient ischemic attack, chronic obstructive pulmonary disease, PFO, hyperlipidemia and coronary artery disease who presents tot he Emergency Department for evaluation of right thumb pain which she reports started last night at 2000. She reports she noticed some discomfort of her right wrist area over past couple day and last night her pain suddenly worsened. She reports it might be due to sleeping wrong on her right hand. She denies any injury or trauma to the right hand. She denies having a cat or any pet animals at home.     She reports she was diagnosed with diverticulosis 2 weeks ago. She was treated conservatively without the need of antibiotics. She reports she stopped taking all her routine medications 2 weeks ago and has not started them yet.     History limited due patient's cognitive impairment.     Independent Historian   None    Review of External Notes   None     Past Medical History     Medical History and Problem List   Depression  Squamous cell carcinoma of skin  Coronary atherosclerosis  PFO  COPD  Hyperlipidemia  History of substance abuse   Stroke    Medications   Albuterol inhaler  Alendronate   Aspirin    Atorvastatin    Bupropion    Fluticasone-Umeclidin-Vilant inhaler  Gabapentin    Levothyroxine    Omeprazole    Sertraline    Physical Exam     Patient Vitals for the past 24 hrs:   BP Temp Temp src Pulse Resp SpO2 Height Weight   05/03/25 1052 (!) 146/91 99  F (37.2  C) Temporal 81 18 96 % 1.575 m (5' 2\") 63.5 kg (140 lb)     Physical Exam  MSK:  Normal movement through the elbow, wrist, and fingers without tendonous deficit. There is pain at extremes of flexion and extension of the right first MCP joint though no pain on passive range of motion.     SKIN:  Warm and dry with strong radial pulse and normal " capillary refill. There is significant swelling of the soft tissue at the thenar musculature with redness and warmth extending up the anterior forearm, consistent with lymphangitis, no finding of abrasion or the lesion over the skin.     NEURO:  5/5 strength through the fingers/wrist/elbow.  Normal sensation through the radial/ulnar/median nerve distributions.    PSYCH:  Normal affect   Diagnostics     Lab Results   Labs Ordered and Resulted from Time of ED Arrival to Time of ED Departure   BASIC METABOLIC PANEL - Abnormal       Result Value    Sodium 140      Potassium 3.8      Chloride 101      Carbon Dioxide (CO2) 26      Anion Gap 13      Urea Nitrogen 8.9      Creatinine 0.97 (*)     GFR Estimate 60 (*)     Calcium 9.3      Glucose 116 (*)    CRP INFLAMMATION - Abnormal    CRP Inflammation 32.95 (*)    CBC WITH PLATELETS AND DIFFERENTIAL - Abnormal    WBC Count 11.4 (*)     RBC Count 4.24      Hemoglobin 12.1      Hematocrit 36.9      MCV 87      MCH 28.5      MCHC 32.8      RDW 13.6      Platelet Count 326      % Neutrophils 71      % Lymphocytes 17      % Monocytes 9      % Eosinophils 1      % Basophils 1      % Immature Granulocytes 2      NRBCs per 100 WBC 0      Absolute Neutrophils 8.1      Absolute Lymphocytes 2.0      Absolute Monocytes 1.0      Absolute Eosinophils 0.1      Absolute Basophils 0.1      Absolute Immature Granulocytes 0.2      Absolute NRBCs 0.0     ERYTHROCYTE SEDIMENTATION RATE AUTO - Normal    Erythrocyte Sedimentation Rate 24         Imaging   POC US SOFT TISSUE   Final Result   Limited Soft Tissue Ultrasound, performed and interpreted by me.      Indication:  Skin redness warmth pain. Evaluate for cellulitis vs abscess.       Body location: left hand      Findings:  There is cobblestoning suggestive of cellulitis in the evaluated area. There is no fluid collection to suggest abscess. No foreign body identified      IMPRESSION: Cellulitis                  XR Hand Left G/E 3 Views  "  Final Result   IMPRESSION: Degenerative change at the STT joint and first CMC joint. Degenerative change first MCP joint. No evidence for fracture or dislocation. No erosive changes.        Independent Interpretation   X-ray of left hand shows significant degenerative changes at the first MCP joint with marked erosions.  ED Course      Medications Administered   Medications   sodium chloride (PF) 0.9% PF flush 3 mL (has no administration in time range)   sodium chloride (PF) 0.9% PF flush 3 mL (has no administration in time range)       Procedures   Procedures     Discussion of Management   Admitting Hospitalist, PRAKASH HerronC for Dr. Saha    ED Course   ED Course as of 05/03/25 1830   Sat May 03, 2025   1107 I obtained the history and examined the patient as above.    1314 I tried calling her and left a voice mail as patient was not in her room    1429 I rechecked and updated the patient.         Additional Documentation  None    Medical Decision Making / Diagnosis     CMS Diagnoses: None    MIPS       None    MDM   Shae Chinchilla is a 79 year old female with a history of cognitive impairment presents to us with complaints of having left hand discomfort.  She initially told triage that this started at 4:00 yesterday.  However, her history is very uneven, noting that she may have been having pain \"all week long.\"  She denies trauma to the area and denies having animals or concern for bites or scratches.  On my exam, she does have a red and swollen thenar eminence and thenar musculature.  The area is red and warm with some lymphangitis extending into the distal portion of the forearm.    This prompted a septic workup.  Fortunately, most of her numbers are reassuring with only a mild elevation of her white blood cell count and a mild elevation of her CRP.  X-ray does not show foreign body or concerns for osteomyelitis.  My bedside ultrasound shows some cobblestoning but no evidence of an " abscess.    With this, I believe the patient has cellulitis of the hand, unlikely to be related to a deeper structure involvement.  I gave her a dose of IV antibiotics and we will admit her under observation status to hospital medicine to make sure that she is improving.  The patient's questions were answered and she is comfort the plan for admission.    Disposition   The patient was admitted to the hospital.     Diagnosis     ICD-10-CM    1. Cellulitis of left hand  L03.114            Scribe Disclosure:  Kay DAVIS, am serving as a scribe at 11:03 AM on 5/3/2025 to document services personally performed by Trierweiler, Chad A, MD based on my observations and the provider's statements to me.        Trierweiler, Chad A, MD  05/03/25 9827       Trierweiler, Chad A, MD  05/03/25 1837

## 2025-05-03 NOTE — PROGRESS NOTES
[facility]  ED Nurse Handoff Report    ED Chief complaint: Hand Pain      ED Diagnosis:   Final diagnoses:   Cellulitis of left hand       Code Status: Inpatient team to discuss code status with patient.    Allergies: No Known Allergies    Patient Story:   Pt BIBA from home for thumb pain which started at 2000 last night, redness and swelling noted, CMS intact, ABCD intact.       Triage Assessment (Adult)       Row Name 05/03/25 1017          Triage Assessment    Airway WDL WDL        Respiratory WDL    Respiratory WDL WDL        Skin Circulation/Temperature WDL    Skin Circulation/Temperature WDL X  hand redness        Cardiac WDL    Cardiac WDL WDL        Peripheral/Neurovascular WDL    Peripheral Neurovascular WDL WDL        Cognitive/Neuro/Behavioral WDL    Cognitive/Neuro/Behavioral WDL WDL                 Focused Assessment:    Cardiovascular:   HTN, denies CP.   Respiratory: wdl, Denies SOB. Denies BOLDEN Room air  Cognitive/Neuro: A&Ox4, at baseline. Somewhat forgetful  Skin: L hand + wrist cellulitis + trace edema     Treatments and/or interventions provided:   Medications   sodium chloride (PF) 0.9% PF flush 3 mL (3 mLs Intracatheter $Given 5/3/25 1523)   sodium chloride (PF) 0.9% PF flush 3 mL (has no administration in time range)   ceFAZolin (ANCEF) 1 g vial to attach to  ml bag for ADULT or 50 ml bag for PEDS (1 g Intravenous $New Bag 5/3/25 1550)      Labs Ordered and Resulted from Time of ED Arrival to Time of ED Departure   BASIC METABOLIC PANEL - Abnormal       Result Value    Sodium 140      Potassium 3.8      Chloride 101      Carbon Dioxide (CO2) 26      Anion Gap 13      Urea Nitrogen 8.9      Creatinine 0.97 (*)     GFR Estimate 60 (*)     Calcium 9.3      Glucose 116 (*)    CRP INFLAMMATION - Abnormal    CRP Inflammation 32.95 (*)    CBC WITH PLATELETS AND DIFFERENTIAL - Abnormal    WBC Count 11.4 (*)     RBC Count 4.24      Hemoglobin 12.1      Hematocrit 36.9      MCV 87   "    MCH 28.5      MCHC 32.8      RDW 13.6      Platelet Count 326      % Neutrophils 71      % Lymphocytes 17      % Monocytes 9      % Eosinophils 1      % Basophils 1      % Immature Granulocytes 2      NRBCs per 100 WBC 0      Absolute Neutrophils 8.1      Absolute Lymphocytes 2.0      Absolute Monocytes 1.0      Absolute Eosinophils 0.1      Absolute Basophils 0.1      Absolute Immature Granulocytes 0.2      Absolute NRBCs 0.0     ERYTHROCYTE SEDIMENTATION RATE AUTO - Normal    Erythrocyte Sedimentation Rate 24     BLOOD CULTURE   BLOOD CULTURE     XR Hand Left G/E 3 Views   Final Result   IMPRESSION: Degenerative change at the STT joint and first CMC joint. Degenerative change first MCP joint. No evidence for fracture or dislocation. No erosive changes.      POC US SOFT TISSUE    (Results Pending)     Patient's response to treatments and/or interventions: Unchanged     To be done/followed up on inpatient unit: Follow Plan of Care-see in-patient orders    Does this patient have any cognitive concerns?: A&Ox4 but forgetful    Activity level - Baseline/Home:  Independent  Activity Level - Current:   Stand with Assist has been stand and pivoting. Likely needing walker    Patient's Preferred language: English   Needed?: No    Isolation: None  Infection: Not Applicable  Patient tested for COVID 19 prior to admission: NO  Bariatric?: No    Vital Signs:   Vitals:    05/03/25 1052 05/03/25 1515 05/03/25 1545   BP: (!) 146/91 (!) 155/73 (!) 140/94   Pulse: 81 86 91   Resp: 18  20   Temp: 99  F (37.2  C)  98.1  F (36.7  C)   TempSrc: Temporal  Oral   SpO2: 96%  97%   Weight: 63.5 kg (140 lb)     Height: 1.575 m (5' 2\")         Cardiac Rhythm: No results found for this or any previous visit.  Was the PSS-3 completed:  Yes  What interventions are required if any? N/A  Family Comments: None at Bedside  OBS brochure/video discussed/provided to patient/family: N/A    For the majority of the shift this patient's " behavior was Green.     Behavioral interventions performed were None.    ED NURSE PHONE NUMBER: [phone]

## 2025-05-03 NOTE — ED TRIAGE NOTES
Pt BIBA from home for thumb pain which started at 2000 last night, redness and swelling noted, CMS intact, ABCD intact.       Triage Assessment (Adult)       Row Name 05/03/25 1017          Triage Assessment    Airway WDL WDL        Respiratory WDL    Respiratory WDL WDL        Skin Circulation/Temperature WDL    Skin Circulation/Temperature WDL X  hand redness        Cardiac WDL    Cardiac WDL WDL        Peripheral/Neurovascular WDL    Peripheral Neurovascular WDL WDL        Cognitive/Neuro/Behavioral WDL    Cognitive/Neuro/Behavioral WDL WDL

## 2025-05-03 NOTE — PHARMACY-ADMISSION MEDICATION HISTORY
Pharmacist Admission Medication History    Admission medication history is complete. The information provided in this note is only as accurate as the sources available at the time of the update.    Information Source(s): Patient, Hospital records, and CareEverywhere/SureScripts via in-person    Pertinent Information: Patient reports not taking her home medications since Easter (04/20/25). Patient seemed confused and accuracy of last doses is questionable. However, medication list is aligned with refill history.     Changes made to PTA medication list:  Added: None  Deleted: None  Changed: None    Allergies reviewed with patient and updates made in EHR: yes    Medication History Completed By: Jon Rosales, PharmD 5/3/2025 3:33 PM    Prior to Admission medications    Medication Sig Last Dose Taking? Auth Provider Long Term End Date   albuterol (PROAIR HFA/PROVENTIL HFA/VENTOLIN HFA) 108 (90 Base) MCG/ACT inhaler Inhale 1-2 puffs into the lungs every 6 hours as needed for shortness of breath, wheezing or cough. 4/20/2025 Yes Unknown, Entered By History Yes    alendronate (FOSAMAX) 70 MG tablet Take 70 mg by mouth every 7 days. 4/20/2025 Yes Unknown, Entered By History Yes    aspirin (ASA) 81 MG chewable tablet Take 81 mg by mouth daily. 4/20/2025  Unknown, Entered By History     atorvastatin (LIPITOR) 40 MG tablet Take 40 mg by mouth daily. 4/20/2025  Unknown, Entered By History Yes    buPROPion (WELLBUTRIN XL) 150 MG 24 hr tablet Take 150 mg by mouth every morning. 4/20/2025  Unknown, Entered By History Yes    cycloSPORINE (RESTASIS) 0.05 % ophthalmic emulsion Place 1 drop into both eyes 2 times daily. 4/20/2025  Unknown, Entered By History     Fluticasone-Umeclidin-Vilant (TRELEGY ELLIPTA) 100-62.5-25 MCG/ACT oral inhaler Inhale 1 puff into the lungs daily. 4/20/2025  Unknown, Entered By History     gabapentin (NEURONTIN) 300 MG capsule Take 600 mg by mouth 2 times daily. 4/20/2025  Unknown, Entered By History  Yes    gabapentin (NEURONTIN) 300 MG capsule Take 300 mg by mouth daily at 4pm. 4/20/2025  Unknown, Entered By History Yes    levothyroxine (SYNTHROID/LEVOTHROID) 50 MCG tablet Take 50 mcg by mouth daily. 4/20/2025  Unknown, Entered By History Yes    omeprazole (PRILOSEC) 20 MG DR capsule Take 20 mg by mouth daily as needed. 4/20/2025  Unknown, Entered By History     sertraline (ZOLOFT) 100 MG tablet Take 150 mg by mouth daily. 4/20/2025  Unknown, Entered By History Yes

## 2025-05-04 LAB
ANION GAP SERPL CALCULATED.3IONS-SCNC: 11 MMOL/L (ref 7–15)
BASOPHILS # BLD AUTO: 0.1 10E3/UL (ref 0–0.2)
BASOPHILS NFR BLD AUTO: 1 %
BUN SERPL-MCNC: 9.1 MG/DL (ref 8–23)
CALCIUM SERPL-MCNC: 8.8 MG/DL (ref 8.8–10.4)
CHLORIDE SERPL-SCNC: 104 MMOL/L (ref 98–107)
CREAT SERPL-MCNC: 0.84 MG/DL (ref 0.51–0.95)
CRP SERPL-MCNC: 77.72 MG/L
EGFRCR SERPLBLD CKD-EPI 2021: 71 ML/MIN/1.73M2
EOSINOPHIL # BLD AUTO: 0.1 10E3/UL (ref 0–0.7)
EOSINOPHIL NFR BLD AUTO: 1 %
ERYTHROCYTE [DISTWIDTH] IN BLOOD BY AUTOMATED COUNT: 13.7 % (ref 10–15)
GLUCOSE SERPL-MCNC: 116 MG/DL (ref 70–99)
HCO3 SERPL-SCNC: 25 MMOL/L (ref 22–29)
HCT VFR BLD AUTO: 33.9 % (ref 35–47)
HGB BLD-MCNC: 11.1 G/DL (ref 11.7–15.7)
IMM GRANULOCYTES # BLD: 0.2 10E3/UL
IMM GRANULOCYTES NFR BLD: 2 %
LYMPHOCYTES # BLD AUTO: 2 10E3/UL (ref 0.8–5.3)
LYMPHOCYTES NFR BLD AUTO: 24 %
MCH RBC QN AUTO: 28.7 PG (ref 26.5–33)
MCHC RBC AUTO-ENTMCNC: 32.7 G/DL (ref 31.5–36.5)
MCV RBC AUTO: 88 FL (ref 78–100)
MONOCYTES # BLD AUTO: 0.9 10E3/UL (ref 0–1.3)
MONOCYTES NFR BLD AUTO: 11 %
NEUTROPHILS # BLD AUTO: 5 10E3/UL (ref 1.6–8.3)
NEUTROPHILS NFR BLD AUTO: 61 %
NRBC # BLD AUTO: 0 10E3/UL
NRBC BLD AUTO-RTO: 0 /100
PLATELET # BLD AUTO: 311 10E3/UL (ref 150–450)
POTASSIUM SERPL-SCNC: 3.6 MMOL/L (ref 3.4–5.3)
RBC # BLD AUTO: 3.87 10E6/UL (ref 3.8–5.2)
SODIUM SERPL-SCNC: 140 MMOL/L (ref 135–145)
WBC # BLD AUTO: 8.2 10E3/UL (ref 4–11)

## 2025-05-04 PROCEDURE — 120N000001 HC R&B MED SURG/OB

## 2025-05-04 PROCEDURE — 99232 SBSQ HOSP IP/OBS MODERATE 35: CPT | Performed by: INTERNAL MEDICINE

## 2025-05-04 PROCEDURE — 36415 COLL VENOUS BLD VENIPUNCTURE: CPT | Performed by: PHYSICIAN ASSISTANT

## 2025-05-04 PROCEDURE — 85025 COMPLETE CBC W/AUTO DIFF WBC: CPT | Performed by: PHYSICIAN ASSISTANT

## 2025-05-04 PROCEDURE — 96376 TX/PRO/DX INJ SAME DRUG ADON: CPT

## 2025-05-04 PROCEDURE — 82565 ASSAY OF CREATININE: CPT | Performed by: PHYSICIAN ASSISTANT

## 2025-05-04 PROCEDURE — G0378 HOSPITAL OBSERVATION PER HR: HCPCS

## 2025-05-04 PROCEDURE — 250N000013 HC RX MED GY IP 250 OP 250 PS 637: Performed by: PHYSICIAN ASSISTANT

## 2025-05-04 PROCEDURE — 86140 C-REACTIVE PROTEIN: CPT | Performed by: PHYSICIAN ASSISTANT

## 2025-05-04 PROCEDURE — 250N000011 HC RX IP 250 OP 636: Performed by: PHYSICIAN ASSISTANT

## 2025-05-04 RX ADMIN — GABAPENTIN 300 MG: 300 CAPSULE ORAL at 16:29

## 2025-05-04 RX ADMIN — GABAPENTIN 600 MG: 300 CAPSULE ORAL at 08:39

## 2025-05-04 RX ADMIN — ASPIRIN 81 MG CHEWABLE TABLET 81 MG: 81 TABLET CHEWABLE at 08:39

## 2025-05-04 RX ADMIN — ATORVASTATIN CALCIUM 40 MG: 40 TABLET, FILM COATED ORAL at 08:39

## 2025-05-04 RX ADMIN — BUPROPION HYDROCHLORIDE 150 MG: 150 TABLET, EXTENDED RELEASE ORAL at 08:39

## 2025-05-04 RX ADMIN — ACETAMINOPHEN 975 MG: 325 TABLET, FILM COATED ORAL at 20:14

## 2025-05-04 RX ADMIN — LEVOTHYROXINE SODIUM 50 MCG: 50 TABLET ORAL at 08:39

## 2025-05-04 RX ADMIN — ACETAMINOPHEN 975 MG: 325 TABLET, FILM COATED ORAL at 08:39

## 2025-05-04 RX ADMIN — CEFAZOLIN 1 G: 1 INJECTION, POWDER, FOR SOLUTION INTRAMUSCULAR; INTRAVENOUS at 16:29

## 2025-05-04 RX ADMIN — CEFAZOLIN 1 G: 1 INJECTION, POWDER, FOR SOLUTION INTRAMUSCULAR; INTRAVENOUS at 08:40

## 2025-05-04 RX ADMIN — UMECLIDINIUM 1 PUFF: 62.5 AEROSOL, POWDER ORAL at 08:45

## 2025-05-04 RX ADMIN — SERTRALINE HYDROCHLORIDE 150 MG: 100 TABLET ORAL at 08:39

## 2025-05-04 RX ADMIN — FLUTICASONE FUROATE AND VILANTEROL TRIFENATATE 1 PUFF: 100; 25 POWDER RESPIRATORY (INHALATION) at 08:45

## 2025-05-04 RX ADMIN — ACETAMINOPHEN 975 MG: 325 TABLET, FILM COATED ORAL at 14:03

## 2025-05-04 RX ADMIN — CEFAZOLIN 1 G: 1 INJECTION, POWDER, FOR SOLUTION INTRAMUSCULAR; INTRAVENOUS at 23:54

## 2025-05-04 RX ADMIN — GABAPENTIN 600 MG: 300 CAPSULE ORAL at 21:44

## 2025-05-04 ASSESSMENT — ACTIVITIES OF DAILY LIVING (ADL)
ADLS_ACUITY_SCORE: 40
ADLS_ACUITY_SCORE: 50
ADLS_ACUITY_SCORE: 40
ADLS_ACUITY_SCORE: 54
ADLS_ACUITY_SCORE: 40
ADLS_ACUITY_SCORE: 40
ADLS_ACUITY_SCORE: 50
ADLS_ACUITY_SCORE: 54
ADLS_ACUITY_SCORE: 54
ADLS_ACUITY_SCORE: 40
ADLS_ACUITY_SCORE: 54
ADLS_ACUITY_SCORE: 40
ADLS_ACUITY_SCORE: 50
ADLS_ACUITY_SCORE: 40
ADLS_ACUITY_SCORE: 46
ADLS_ACUITY_SCORE: 40

## 2025-05-04 NOTE — PLAN OF CARE
Goal Outcome Evaluation:      Plan of Care Reviewed With: patient    PRIMARY Concern: Left hand cellulitis   SAFETY RISK Concerns (fall risk, behaviors, etc.): Fall risk  Aggression Tool Color: Green   Isolation/Type: None   Tests/Procedures for NEXT shift: AM labs  Consults? (Pending/following, signed-off?) Ortho consult pending, paged, will see later.  Where is patient from? (Home, TCU, etc.): [facility]   Other Important info for NEXT shift: None   Anticipated DC date & active delays: TBD     SUMMARY NOTE:    Orientation/Cognitive: A&O X4, [place], Forgetful  Observation Goals (Met/ Not Met): Not met   Mobility Level/Assist Equipment: SBA   Antibiotics & Plan (IV/po, length of tx left): IV Ancef Q 8 hrs  Pain Management: Minimal pain. On S  scheduled tylenol   Complete Pain Reassessment: Y/N =Yes  Tele/VS/O2: VSS on RA   ABNL Lab/BG: CRP infla- 77.72  Diet: Reg   Bowel/Bladder: Continent. Up to bathroom.  Skin Concerns: Scattered bruises. Erythema and swelling especially of the left thenar eminence.   Drains/Devices: PIV SL   Patient Stated Goal for Today: Rest

## 2025-05-04 NOTE — PLAN OF CARE
Goal Outcome Evaluation:  PRIMARY Concern: Left hand cellulitis   SAFETY RISK Concerns (fall risk, behaviors, etc.): Fall       Aggression Tool Color: Green   Isolation/Type: None   Tests/Procedures for NEXT shift: None   Consults? (Pending/following, signed-off?) Ortho consult pending   Where is patient from? (Home, TCU, etc.): Apartment   Other Important info for NEXT shift: None   Anticipated DC date & active delays: TBD     SUMMARY NOTE:    Orientation/Cognitive: A&O X4, [place], Forgetful  Observation Goals (Met/ Not Met): Not met   Mobility Level/Assist Equipment: SBA   Antibiotics & Plan (IV/po, length of tx left): IV Ancef   Pain Management: Scheduled tylenol   Complete Pain Reassessment: Y/N =Yes  Tele/VS/O2: VSS on RA   ABNL Lab/BG: WBC=11.4  Diet: NPO  Bowel/Bladder: Continent   Skin Concerns: Scattered bruises. Erythema and swelling especially of the left thenar eminence.   Drains/Devices: PIV SL   Patient Stated Goal for Today: Rest            Observation goals  PRIOR TO DISCHARGE       Comments:   -diagnostic tests and consults completed and resulted- Not met   -vital signs normal or at patient baseline- Met   -adequate pain control on oral analgesics- Met   -tolerating oral antibiotics or has plans for home infusion setup- Not met

## 2025-05-04 NOTE — PROGRESS NOTES
Observation goals  PRIOR TO DISCHARGE       Comments:   -diagnostic tests and consults completed and resulted- Not met   -vital signs normal or at patient baseline- Met   -adequate pain control on oral analgesics- Met   -tolerating oral antibiotics or has plans for home infusion setup- Not met

## 2025-05-04 NOTE — PROGRESS NOTES
Observation goals  PRIOR TO DISCHARGE        Comments: -diagnostic tests and consults completed and resulted- not met  -vital signs normal or at patient baseline- met  -adequate pain control on oral analgesics- met  -tolerating oral antibiotics or has plans for home infusion setup- iv ancef  Nurse to notify provider when observation goals have been met and patient is ready for discharge.

## 2025-05-04 NOTE — PLAN OF CARE
Goal Outcome Evaluation:      Plan of Care Reviewed With: patient    PRIMARY Concern: L hand cellulitis   SAFETY RISK Concerns (fall risk, behaviors, etc.): Fall       Aggression Tool Color: Green  Isolation/Type: Green   Tests/Procedures for NEXT shift: None  Consults? (Pending/following, signed-off?) Ortho following   Where is patient from? (Home, TCU, etc.[facility]  Other Important info for NEXT shift: None  Anticipated DC date & active delays: TBD  _____________________________________________________________________________  SUMMARY NOTE:   Orientation/Cognitive: AOx4, [place], forgetful  Observation Goals (Met/ Not Met): Inpatient   Mobility Level/Assist Equipment: SBA/W  Antibiotics & Plan (IV/po, length of tx left): IV Ancef q8 hrs  Pain Management: C/o 3/10 L hand pain, on scheduled Tylenol. PRN Oxy and Dilaudid available   Complete Pain Reassessment: Y Due next: Next shift   Tele/VS/O2: VSS on RA  ABNL Lab/BG: CRP 77.72; Hgb 11.1  Diet: Regular   Bowel/Bladder: Continent, 1 BM throughout shift   Skin Concerns: Scattered bruising. Redness, swelling and warmth to L hand  Drains/Devices: PIV SL   Patient Stated Goal for Today: Rest

## 2025-05-04 NOTE — PROGRESS NOTES
[facility]    Hospitalist Progress Note    Date of Admission: 5/3/2025    Assessment & Plan   Shae Chinchilla is a 79 year old female with PMHx of depression, hypothyroidism, chronic low back pain, COPD, HLD, hx CVA, hx substance abuse in remission, hx PFO who was admitted on 5/3/2025 with LUE cellulitis.    Left hand cellulitis  *Presented with left hand pain, redness, warmth and swelling x 2 days. Last night, her thumb and thenar eminence was especially painful, prompting evaluation. No recent fall/trauma, no animal scratches or bites. Afebrile with WBC 11.4 and CRP 32. Xray w/ DJD, no fracture or erosive changes. Bedside US w/o abscess.  *Started on IV Ancef on admission.   -- although WBC quickly normalized, CRP trended up; blood cultures remain negative  -- cont IV Ancef (5/3-present)  -- await input from ortho as to whether patient will require any urgent surgical intervention this stay  -- cont sched Tylenol, prn oxycodone for pain  -- keep LUE elevated    Severe coronary calcifications  Hyperlipidemia  *Noted on CT chest 3/2023 and seen by cardiology 6/2023.   *Chronic and stable on ASA, statin.    History of PFO  *Per cardiology note from 6/6/2023, did not recommend PFO closure    History of caudate stroke  *Prior incidental finding on MRI obtained for work of memory complaint. Previously seen by neurology 1/17/20 through [facility]. Chronic and stable on ASA, statin.     COPD  *Chronic and stable on home inhalers    Hypothyroidism  *Chronic and stable on Synthroid    Chronic low back pain with radiation to LLE  *Chronic and stable on Gabapentin, prn Tylenol    Depression  *Chronic and stable on Zoloft and Wellbutrin    GERD  *Chronic and stable on PPI     FEN: no IVFs, lytes stable, regular diet  DVT Prophylaxis: PCDs  Code Status: Full Code    Disposition: Continue IV antibiotics today. Await ortho input. Anticipate possible discharge on oral antibiotic in AM if no need for  "surgery    Medically Ready for Discharge: Anticipated Tomorrow      Aura Gómez Juan Luis, DO    Clinically Significant Risk Factors Present on Admission                 # Drug Induced Platelet Defect: home medication list includes an antiplatelet medication             # Overweight: Estimated body mass index is 25.69 kg/m  as calculated from the following:    Height as of this encounter: 1.575 m (5' 2\").    Weight as of this encounter: 63.7 kg (140 lb 6.9 oz).       # Financial/Environmental Concerns:             Medical Decision Making       Please see A&P for additional details of medical decision making.           Interval History   Chart reviewed. Seen this afternoon. Resting comfortably. Tells me she's spent most the day resting. +Ongoing L hand swelling, mostly along dorsum involving thumb/index finger. Swelling minimally improved but no worse than yesterday.  Able to make a fist. No extension into hand/wrist. Otherwise feeling okay.     -Data reviewed today: I reviewed all new labs and imaging results over the last 24 hours. I personally reviewed no images or EKG's today.    Physical Exam   Temp: 98.3  F (36.8  C) Temp src: Oral BP: 129/68 Pulse: 77   Resp: 18 SpO2: 94 % O2 Device: None (Room air)    Vitals:    05/03/25 1052 05/03/25 1943   Weight: 63.5 kg (140 lb) 63.7 kg (140 lb 6.9 oz)     Vital Signs with Ranges  Temp:  [97.8  F (36.6  C)-98.3  F (36.8  C)] 98.3  F (36.8  C)  Pulse:  [77-96] 77  Resp:  [17-20] 18  BP: ()/(60-99) 129/68  SpO2:  [93 %-98 %] 94 %  I/O last 3 completed shifts:  In: 100 [P.O.:100]  Out: -     Constitutional: Resting comfortably, alert and conversing appropriately, NAD  Respiratory: CTAB, no wheeze, no increased work of breathing  Cardiovascular: HRRR, no MGR, no LE edema  GI: S, NT, ND, +BS  Skin/Integumen: faint erythema and ++swelling on dorsum of L hand and thenar eminence with extension into thumb/index finger; able to make a fist without pain, ROM intact in L " wrist  Other:      Medications   Current Facility-Administered Medications   Medication Dose Route Frequency Provider Last Rate Last Admin     Current Facility-Administered Medications   Medication Dose Route Frequency Provider Last Rate Last Admin    acetaminophen (TYLENOL) tablet 975 mg  975 mg Oral TID Emi Cedillo PA-C   975 mg at 05/04/25 0839    aspirin (ASA) chewable tablet 81 mg  81 mg Oral Daily Emi Cedillo PA-C   81 mg at 05/04/25 0839    atorvastatin (LIPITOR) tablet 40 mg  40 mg Oral Daily Emi Cedillo PA-C   40 mg at 05/04/25 0839    buPROPion (WELLBUTRIN XL) 24 hr tablet 150 mg  150 mg Oral QAM Emi Cedillo PA-C   150 mg at 05/04/25 0839    ceFAZolin (ANCEF) 1 g vial to attach to  ml bag for ADULT or 50 ml bag for PEDS  1 g Intravenous Q8H Emi Cedillo PA-C   1 g at 05/04/25 0840    fluticasone-vilanterol (BREO ELLIPTA) 100-25 MCG/ACT inhaler 1 puff  1 puff Inhalation Daily Emi Cedillo PA-C   1 puff at 05/04/25 0845    And    umeclidinium (INCRUSE ELLIPTA) 62.5 MCG/ACT inhaler 1 puff  1 puff Inhalation Daily Emi Cedillo PA-C   1 puff at 05/04/25 0845    gabapentin (NEURONTIN) capsule 300 mg  300 mg Oral Daily at 4 pm Emi Cedillo PA-C   300 mg at 05/03/25 1839    gabapentin (NEURONTIN) capsule 600 mg  600 mg Oral BID Emi Cedillo PA-C   600 mg at 05/04/25 0839    levothyroxine (SYNTHROID/LEVOTHROID) tablet 50 mcg  50 mcg Oral Daily Emi Cedillo PA-C   50 mcg at 05/04/25 0839    sertraline (ZOLOFT) tablet 150 mg  150 mg Oral Daily Emi Cedillo PA-C   150 mg at 05/04/25 0839    sodium chloride (PF) 0.9% PF flush 3 mL  3 mL Intracatheter Q8H Aura Fontenot DO   3 mL at 05/03/25 2200       Data   Recent Labs   Lab 05/04/25  0551 05/03/25  1121   WBC 8.2 11.4*   HGB 11.1* 12.1   MCV 88 87    326    140   POTASSIUM 3.6 3.8   CHLORIDE 104 101   CO2 25 26   BUN 9.1 8.9   CR 0.84 0.97*    ANIONGAP 11 13   FRANCINE 8.8 9.3   * 116*       Recent Results (from the past 24 hours)   XR Hand Left G/E 3 Views    Narrative    EXAM: XR HAND LEFT G/E 3 VIEWS  LOCATION: [facility]  DATE: 5/3/2025    INDICATION: swelling about the thenar musculature   eval for foreign body, gas  COMPARISON: None.      Impression    IMPRESSION: Degenerative change at the STT joint and first CMC joint. Degenerative change first MCP joint. No evidence for fracture or dislocation. No erosive changes.   POC US SOFT TISSUE    Impression    Limited Soft Tissue Ultrasound, performed and interpreted by me.    Indication:  Skin redness warmth pain. Evaluate for cellulitis vs abscess.     Body location: left hand    Findings:  There is cobblestoning suggestive of cellulitis in the evaluated area. There is no fluid collection to suggest abscess. No foreign body identified    IMPRESSION: Cellulitis

## 2025-05-04 NOTE — CONSULTS
[facility]    Orthopedic Consultation    Shae Chinchilla MRN# [id number]   Age: 79 year old YOB: 1945     Date of Admission:  5/3/2025    Reason for consult: Left hand cellulitis       Requesting physician: Emi Cedillo PA-C        Level of consult: One-time consult to assist in determining a diagnosis and to recommend an appropriate treatment plan           Assessment and Plan:   Assessment:   79-year-old F with left hand cellulitis, no evidence of tenosynovitis or abscess.      Plan:   Patient denies any puncture wounds, or injury to the left hand prior to the infection.  There is no notable puncture wounds or other wounds on the hand at this time.  There is no evidence of tenosynovitis flexor or extensor.  There is no palpable abscess.  On review of ultrasound there is no abscess.  Patient is vitally stable and afebrile.  Normal white count.  For these reasons, orthopedics does not have surgical intervention planned at this time.  We will follow-up tomorrow to ensure that she continues to improve.    -Continue to trend ESR/CRP  - Continue IV antibiotics overnight  - Reassess thenar eminence tomorrow    Please contact orthopedic trauma team if any questions or concerns arise.           Chief Complaint:   Left hand cellulitis         History of Present Illness:   Shae Chinchilla is a 79 year old female with PMH of depression, hypothyroidism, chronic low back pain, COPD, HLD, hx CVA, hx substance abuse in remission, hx PFO who was admitted on 5/3/25 with LUE cellulitis.     No recent trauma, fall.  No puncture wounds.  No animal scratches or bites.  The appearance of her left hand has improved on IV Ancef over the last 24 hours.          Past Medical History:   No past medical history on file.          Past Surgical History:   No past surgical history on file.          Social History:     Social History     Tobacco Use    Smoking status: Not on file    Smokeless  tobacco: Not on file   Substance Use Topics    Alcohol use: Not on file             Family History:   No family history on file.          Immunizations:     VACCINE/DOSE   Diptheria   DPT   DTAP   HBIG   Hepatitis A   Hepatitis B   HIB   Influenza   Measles   Meningococcal   MMR   Mumps   Pneumococcal   Polio   Rubella   Small Pox   TDAP   Varicella   Zoster             Allergies:   No Known Allergies          Medications:     Current Facility-Administered Medications   Medication Dose Route Frequency Provider Last Rate Last Admin    acetaminophen (TYLENOL) tablet 975 mg  975 mg Oral TID Emi Cedillo PA-C   975 mg at 05/04/25 1403    albuterol (PROVENTIL HFA/VENTOLIN HFA) inhaler  1-2 puff Inhalation Q6H PRN Emi Cedillo PA-C        aspirin (ASA) chewable tablet 81 mg  81 mg Oral Daily Emi Cedillo PA-C   81 mg at 05/04/25 0839    atorvastatin (LIPITOR) tablet 40 mg  40 mg Oral Daily Emi Cedillo PA-C   40 mg at 05/04/25 0839    buPROPion (WELLBUTRIN XL) 24 hr tablet 150 mg  150 mg Oral QAM Emi Cedillo PA-C   150 mg at 05/04/25 0839    ceFAZolin (ANCEF) 1 g vial to attach to  ml bag for ADULT or 50 ml bag for PEDS  1 g Intravenous Q8H Emi Cedillo PA-C   1 g at 05/04/25 0840    fluticasone-vilanterol (BREO ELLIPTA) 100-25 MCG/ACT inhaler 1 puff  1 puff Inhalation Daily Emi Cedillo PA-C   1 puff at 05/04/25 0845    And    umeclidinium (INCRUSE ELLIPTA) 62.5 MCG/ACT inhaler 1 puff  1 puff Inhalation Daily Emi Cedillo PA-C   1 puff at 05/04/25 0845    gabapentin (NEURONTIN) capsule 300 mg  300 mg Oral Daily at 4 pm Emi Cedillo PA-C   300 mg at 05/03/25 1839    gabapentin (NEURONTIN) capsule 600 mg  600 mg Oral BID Emi Cedillo PA-C   600 mg at 05/04/25 0839    hydrALAZINE (APRESOLINE) tablet 10 mg  10 mg Oral Q4H PRN Emi Cedillo PA-C        Or    hydrALAZINE (APRESOLINE) injection 10 mg  10 mg Intravenous Q4H PRN  Emi Cedillo PA-C        HYDROmorphone (DILAUDID) injection 0.2 mg  0.2 mg Intravenous Q2H PRN Emi Cedillo PA-C        HYDROmorphone (DILAUDID) injection 0.4 mg  0.4 mg Intravenous Q2H PRN Emi Cedillo PA-C        levothyroxine (SYNTHROID/LEVOTHROID) tablet 50 mcg  50 mcg Oral Daily Emi Cedillo PA-C   50 mcg at 05/04/25 0839    melatonin tablet 1 mg  1 mg Oral At Bedtime PRN Emi Cedillo PA-C        naloxone (NARCAN) injection 0.2 mg  0.2 mg Intravenous Q2 Min PRN Aura Mcknight DO        Or    naloxone (NARCAN) injection 0.4 mg  0.4 mg Intravenous Q2 Min PRN Aura Mcknight DO        Or    naloxone (NARCAN) injection 0.2 mg  0.2 mg Intramuscular Q2 Min PRN Aura Mcknight DO        Or    naloxone (NARCAN) injection 0.4 mg  0.4 mg Intramuscular Q2 Min PRN Aura Mcknight DO        ondansetron (ZOFRAN ODT) ODT tab 4 mg  4 mg Oral Q6H PRN Emi Cedillo PA-C        Or    ondansetron (ZOFRAN) injection 4 mg  4 mg Intravenous Q6H PRN Eim Cedillo PA-C        oxyCODONE (ROXICODONE) tablet 5 mg  5 mg Oral Q4H PRN Emi Cedillo PA-C        oxyCODONE IR (ROXICODONE) half-tab 2.5 mg  2.5 mg Oral Q4H PRN Emi Cedillo PA-C        pantoprazole (PROTONIX) EC tablet 20 mg  20 mg Oral Daily PRN Emi Cedillo PA-C        prochlorperazine (COMPAZINE) injection 5 mg  5 mg Intravenous Q6H PRN Emi Cedillo PA-C        Or    prochlorperazine (COMPAZINE) tablet 5 mg  5 mg Oral Q6H PRN Emi Cedillo PA-C        senna-docusate (SENOKOT-S/PERICOLACE) 8.6-50 MG per tablet 1 tablet  1 tablet Oral BID PRN Emi Cedillo PA-C        Or    senna-docusate (SENOKOT-S/PERICOLACE) 8.6-50 MG per tablet 2 tablet  2 tablet Oral BID PRN Emi Cedillo PA-C        sertraline (ZOLOFT) tablet 150 mg  150 mg Oral Daily Emi Cedillo PA-C   150 mg at 05/04/25 0839    sodium chloride (PF) 0.9% PF flush 3 mL   "3 mL Intracatheter Q8H YOCASTA Aura Mcknight,    3 mL at 05/04/25 1404    sodium chloride (PF) 0.9% PF flush 3 mL  3 mL Intracatheter q1 min prn Aura Mcknight DO                 Review of Systems:   CV: NEGATIVE for chest pain, palpitations or peripheral edema  C: NEGATIVE for fever, chills, change in weight  E/M: NEGATIVE for ear, mouth and throat problems  R: NEGATIVE for significant cough or SOB          Physical Exam:   All vitals have been reviewed  Patient Vitals for the past 24 hrs:   BP Temp Temp src Pulse Resp SpO2 Height Weight   05/04/25 1541 122/51 98.1  F (36.7  C) Oral 83 18 94 % -- --   05/04/25 1241 129/68 98.3  F (36.8  C) Oral 77 18 94 % -- --   05/04/25 0700 126/80 98.2  F (36.8  C) Oral 77 18 94 % -- --   05/04/25 0437 98/66 97.8  F (36.6  C) Oral 90 18 94 % -- --   05/03/25 2300 99/65 98  F (36.7  C) Oral 94 18 93 % -- --   05/03/25 2100 -- -- -- -- 18 -- -- --   05/03/25 1943 100/69 98.3  F (36.8  C) Oral 94 17 93 % 1.575 m (5' 2\") 63.7 kg (140 lb 6.9 oz)   05/03/25 1915 112/60 -- -- 93 -- 95 % -- --   05/03/25 1913 -- -- -- -- -- 95 % -- --   05/03/25 1858 114/76 -- -- 90 -- -- -- --   05/03/25 1845 135/81 -- -- 96 -- 95 % -- --   05/03/25 1844 -- -- -- -- -- 95 % -- --   05/03/25 1842 135/81 -- -- 96 -- -- -- --   05/03/25 1830 -- -- -- -- -- 95 % -- --   05/03/25 1828 (!) 137/99 -- -- 96 -- -- -- --   05/03/25 1731 -- -- -- -- -- 96 % -- --   05/03/25 1730 136/90 -- -- 94 -- -- -- --   05/03/25 1700 (!) 139/92 -- -- 93 18 93 % -- --   05/03/25 1638 -- -- -- -- -- 97 % -- --   05/03/25 1628 129/72 -- -- 90 -- -- -- --   05/03/25 1618 122/71 -- -- -- -- 98 % -- --   05/03/25 1600 129/83 -- -- 83 -- 97 % -- --       Intake/Output Summary (Last 24 hours) at 5/4/2025 1556  Last data filed at 5/3/2025 2136  Gross per 24 hour   Intake 100 ml   Output --   Net 100 ml       Constitutional: Pleasant, alert, appropriate, following commands.  HEENT: Head atraumatic normocephalic. "   Respiratory: Unlabored breathing no audible wheeze  Cardiovascular: Regular rate and rhythm per pulses.  GI: Abdomen is non-distended.  Lymph/Hematologic: No lymphadenopathy in areas examined.  Genitourinary:  No hanson  Musculoskeletal: On examination of the left upper extremity and hand.  There is mild erythema, very light pink in color involving the left hand dorsal/palmar aspect.  She has notable soft tissue swelling lateral to the thenar eminence.  No wrist effusion, other swelling noted.  She is able to make a fist without pain.  She has no pain on palpation of the flexor or extensor tendons.  She has no pain on passive stretching.  She has no fusiform swelling.  No palpable abscess.  No swelling of the wrist or involvement of the more proximal upper extremity.            Data:   All laboratory data reviewed  Results for orders placed or performed during the hospital encounter of 05/03/25   XR Hand Left G/E 3 Views     Status: None    Narrative    EXAM: XR HAND LEFT G/E 3 VIEWS  LOCATION: [facility]  DATE: 5/3/2025    INDICATION: swelling about the thenar musculature   eval for foreign body, gas  COMPARISON: None.      Impression    IMPRESSION: Degenerative change at the STT joint and first CMC joint. Degenerative change first MCP joint. No evidence for fracture or dislocation. No erosive changes.   POC US SOFT TISSUE     Status: None    Impression    Limited Soft Tissue Ultrasound, performed and interpreted by me.    Indication:  Skin redness warmth pain. Evaluate for cellulitis vs abscess.     Body location: left hand    Findings:  There is cobblestoning suggestive of cellulitis in the evaluated area. There is no fluid collection to suggest abscess. No foreign body identified    IMPRESSION: Cellulitis           Basic metabolic panel     Status: Abnormal   Result Value Ref Range    Sodium 140 135 - 145 mmol/L    Potassium 3.8 3.4 - 5.3 mmol/L    Chloride 101 98 - 107 mmol/L     Carbon Dioxide (CO2) 26 22 - 29 mmol/L    Anion Gap 13 7 - 15 mmol/L    Urea Nitrogen 8.9 8.0 - 23.0 mg/dL    Creatinine 0.97 (H) 0.51 - 0.95 mg/dL    GFR Estimate 60 (L) >60 mL/min/1.73m2    Calcium 9.3 8.8 - 10.4 mg/dL    Glucose 116 (H) 70 - 99 mg/dL   CRP inflammation     Status: Abnormal   Result Value Ref Range    CRP Inflammation 32.95 (H) <5.00 mg/L   Erythrocyte sedimentation rate auto     Status: Normal   Result Value Ref Range    Erythrocyte Sedimentation Rate 24 0 - 30 mm/hr   Harper Draw     Status: None    Narrative    The following orders were created for panel order Harper Draw.  Procedure                               Abnormality         Status                     ---------                               -----------         ------                     Extra Blue Top Tube[[id number]]                             Final result               Extra Red Top Tube[[id number]]                              Final result                 Please view results for these tests on the individual orders.   CBC with platelets and differential     Status: Abnormal   Result Value Ref Range    WBC Count 11.4 (H) 4.0 - 11.0 10e3/uL    RBC Count 4.24 3.80 - 5.20 10e6/uL    Hemoglobin 12.1 11.7 - 15.7 g/dL    Hematocrit 36.9 35.0 - 47.0 %    MCV 87 78 - 100 fL    MCH 28.5 26.5 - 33.0 pg    MCHC 32.8 31.5 - 36.5 g/dL    RDW 13.6 10.0 - 15.0 %    Platelet Count 326 150 - 450 10e3/uL    % Neutrophils 71 %    % Lymphocytes 17 %    % Monocytes 9 %    % Eosinophils 1 %    % Basophils 1 %    % Immature Granulocytes 2 %    NRBCs per 100 WBC 0 <1 /100    Absolute Neutrophils 8.1 1.6 - 8.3 10e3/uL    Absolute Lymphocytes 2.0 0.8 - 5.3 10e3/uL    Absolute Monocytes 1.0 0.0 - 1.3 10e3/uL    Absolute Eosinophils 0.1 0.0 - 0.7 10e3/uL    Absolute Basophils 0.1 0.0 - 0.2 10e3/uL    Absolute Immature Granulocytes 0.2 <=0.4 10e3/uL    Absolute NRBCs 0.0 10e3/uL   Extra Blue Top Tube     Status: None   Result Value Ref Range    Hold Specimen  [facility]    Extra Red Top Tube     Status: None   Result Value Ref Range    Hold Specimen [facility]    Basic metabolic panel     Status: Abnormal   Result Value Ref Range    Sodium 140 135 - 145 mmol/L    Potassium 3.6 3.4 - 5.3 mmol/L    Chloride 104 98 - 107 mmol/L    Carbon Dioxide (CO2) 25 22 - 29 mmol/L    Anion Gap 11 7 - 15 mmol/L    Urea Nitrogen 9.1 8.0 - 23.0 mg/dL    Creatinine 0.84 0.51 - 0.95 mg/dL    GFR Estimate 71 >60 mL/min/1.73m2    Calcium 8.8 8.8 - 10.4 mg/dL    Glucose 116 (H) 70 - 99 mg/dL   CRP inflammation     Status: Abnormal   Result Value Ref Range    CRP Inflammation 77.72 (H) <5.00 mg/L   CBC with platelets and differential     Status: Abnormal   Result Value Ref Range    WBC Count 8.2 4.0 - 11.0 10e3/uL    RBC Count 3.87 3.80 - 5.20 10e6/uL    Hemoglobin 11.1 (L) 11.7 - 15.7 g/dL    Hematocrit 33.9 (L) 35.0 - 47.0 %    MCV 88 78 - 100 fL    MCH 28.7 26.5 - 33.0 pg    MCHC 32.7 31.5 - 36.5 g/dL    RDW 13.7 10.0 - 15.0 %    Platelet Count 311 150 - 450 10e3/uL    % Neutrophils 61 %    % Lymphocytes 24 %    % Monocytes 11 %    % Eosinophils 1 %    % Basophils 1 %    % Immature Granulocytes 2 %    NRBCs per 100 WBC 0 <1 /100    Absolute Neutrophils 5.0 1.6 - 8.3 10e3/uL    Absolute Lymphocytes 2.0 0.8 - 5.3 10e3/uL    Absolute Monocytes 0.9 0.0 - 1.3 10e3/uL    Absolute Eosinophils 0.1 0.0 - 0.7 10e3/uL    Absolute Basophils 0.1 0.0 - 0.2 10e3/uL    Absolute Immature Granulocytes 0.2 <=0.4 10e3/uL    Absolute NRBCs 0.0 10e3/uL   Blood Culture Peripheral Blood     Status: Normal (Preliminary result)    Specimen: Peripheral Blood   Result Value Ref Range    Culture No growth after 12 hours    Blood Culture Peripheral Blood     Status: Normal (Preliminary result)    Specimen: Peripheral Blood   Result Value Ref Range    Culture No growth after 12 hours    CBC with platelets differential     Status: Abnormal    Narrative    The following orders were created for panel order CBC with platelets  differential.  Procedure                               Abnormality         Status                     ---------                               -----------         ------                     CBC with platelets and ...[[id number]]  Abnormal            Final result                 Please view results for these tests on the individual orders.   CBC with platelets differential     Status: Abnormal    Narrative    The following orders were created for panel order CBC with platelets differential.  Procedure                               Abnormality         Status                     ---------                               -----------         ------                     CBC with platelets and ...[[id number]]  Abnormal            Final result                 Please view results for these tests on the individual orders.          Attestation:  Amount of time performed on this consult: 50 minutes.    Concepcion Bergman PA-C  [facility] Orthopedics Trauma Team   [facility]  Non-operative provider (Floor MAURICE/ Trauma Rounds)

## 2025-05-05 LAB
CRP SERPL-MCNC: 71.62 MG/L
ERYTHROCYTE [SEDIMENTATION RATE] IN BLOOD BY WESTERGREN METHOD: 54 MM/HR (ref 0–30)

## 2025-05-05 PROCEDURE — 99232 SBSQ HOSP IP/OBS MODERATE 35: CPT | Performed by: INTERNAL MEDICINE

## 2025-05-05 PROCEDURE — 250N000013 HC RX MED GY IP 250 OP 250 PS 637: Performed by: PHYSICIAN ASSISTANT

## 2025-05-05 PROCEDURE — 250N000013 HC RX MED GY IP 250 OP 250 PS 637: Performed by: INTERNAL MEDICINE

## 2025-05-05 PROCEDURE — 86140 C-REACTIVE PROTEIN: CPT | Performed by: STUDENT IN AN ORGANIZED HEALTH CARE EDUCATION/TRAINING PROGRAM

## 2025-05-05 PROCEDURE — 250N000011 HC RX IP 250 OP 636: Performed by: PHYSICIAN ASSISTANT

## 2025-05-05 PROCEDURE — 120N000001 HC R&B MED SURG/OB

## 2025-05-05 PROCEDURE — 85652 RBC SED RATE AUTOMATED: CPT | Performed by: INTERNAL MEDICINE

## 2025-05-05 PROCEDURE — 36415 COLL VENOUS BLD VENIPUNCTURE: CPT | Performed by: STUDENT IN AN ORGANIZED HEALTH CARE EDUCATION/TRAINING PROGRAM

## 2025-05-05 RX ORDER — DIPHENHYDRAMINE HCL 25 MG
25-50 CAPSULE ORAL EVERY 6 HOURS PRN
Status: DISCONTINUED | OUTPATIENT
Start: 2025-05-05 | End: 2025-05-07 | Stop reason: HOSPADM

## 2025-05-05 RX ORDER — DIAZEPAM 5 MG/1
5 TABLET ORAL
Status: COMPLETED | OUTPATIENT
Start: 2025-05-05 | End: 2025-05-06

## 2025-05-05 RX ADMIN — ACETAMINOPHEN 975 MG: 325 TABLET, FILM COATED ORAL at 08:34

## 2025-05-05 RX ADMIN — ATORVASTATIN CALCIUM 40 MG: 40 TABLET, FILM COATED ORAL at 08:34

## 2025-05-05 RX ADMIN — ACETAMINOPHEN 975 MG: 325 TABLET, FILM COATED ORAL at 14:14

## 2025-05-05 RX ADMIN — BUPROPION HYDROCHLORIDE 150 MG: 150 TABLET, EXTENDED RELEASE ORAL at 08:33

## 2025-05-05 RX ADMIN — GABAPENTIN 600 MG: 300 CAPSULE ORAL at 08:33

## 2025-05-05 RX ADMIN — FLUTICASONE FUROATE AND VILANTEROL TRIFENATATE 1 PUFF: 100; 25 POWDER RESPIRATORY (INHALATION) at 08:36

## 2025-05-05 RX ADMIN — SERTRALINE HYDROCHLORIDE 150 MG: 100 TABLET ORAL at 08:34

## 2025-05-05 RX ADMIN — GABAPENTIN 300 MG: 300 CAPSULE ORAL at 15:45

## 2025-05-05 RX ADMIN — GABAPENTIN 600 MG: 300 CAPSULE ORAL at 21:56

## 2025-05-05 RX ADMIN — ASPIRIN 81 MG CHEWABLE TABLET 81 MG: 81 TABLET CHEWABLE at 08:34

## 2025-05-05 RX ADMIN — LEVOTHYROXINE SODIUM 50 MCG: 50 TABLET ORAL at 08:34

## 2025-05-05 RX ADMIN — UMECLIDINIUM 1 PUFF: 62.5 AEROSOL, POWDER ORAL at 08:36

## 2025-05-05 RX ADMIN — DIPHENHYDRAMINE HYDROCHLORIDE 25 MG: 25 CAPSULE ORAL at 14:20

## 2025-05-05 RX ADMIN — CEFAZOLIN 1 G: 1 INJECTION, POWDER, FOR SOLUTION INTRAMUSCULAR; INTRAVENOUS at 08:35

## 2025-05-05 RX ADMIN — CEFAZOLIN 1 G: 1 INJECTION, POWDER, FOR SOLUTION INTRAMUSCULAR; INTRAVENOUS at 15:45

## 2025-05-05 RX ADMIN — Medication 1 MG: at 21:56

## 2025-05-05 ASSESSMENT — ACTIVITIES OF DAILY LIVING (ADL)
ADLS_ACUITY_SCORE: 54

## 2025-05-05 NOTE — PROVIDER NOTIFICATION
MD Notification    Notified Person: MD    Notified Person Name: Dr. Mcknight    Notification Date/Time: 5/5/25 9848    Notification Interaction: vocera paged    Purpose of Notification:Pt requesting PRN Benadryl dose for seasonal allergies, what she takes at home if it is bugging her. Thanks.    Orders Received: benadryl PRN

## 2025-05-05 NOTE — PROGRESS NOTES
[facility]    Hospitalist Progress Note    Date of Admission: 5/3/2025    Assessment & Plan   Shae Chinchilla is a 79 year old female with PMHx of depression, hypothyroidism, chronic low back pain, COPD, HLD, hx CVA, hx substance abuse in remission, hx PFO who was admitted on 5/3/2025 with LUE cellulitis.    Left hand cellulitis  *Presented with left hand pain, redness, warmth and swelling x 2 days. Last night, her thumb and thenar eminence was especially painful, prompting evaluation. No recent fall/trauma, no animal scratches or bites.   *In ED, was afebrile and VSS. WBC 11.4, CRP 32. Xray w/ DJD, no fracture or erosive changes. Bedside US was negative for abscess. Started on IV Ancef on admission.   *Ortho following.   -- although WBC quickly normalized, CRP trended up and has remained elevated; blood cultures remain negative  -- MRI hand ordered per ortho  -- cont IV Ancef (5/3-present)  -- cont sched Tylenol, prn oxycodone for pain  -- keep LUE elevated    Severe coronary calcifications  Hyperlipidemia  *Noted on CT chest 3/2023 and seen by cardiology 6/2023.   *Chronic and stable on ASA, statin.    History of PFO  *Per cardiology note from 6/6/2023, did not recommend PFO closure    History of caudate stroke  *Prior incidental finding on MRI obtained for work of memory complaint. Previously seen by neurology 1/17/20 through [facility]. Chronic and stable on ASA, statin.     COPD  *Chronic and stable on home inhalers    Hypothyroidism  *Chronic and stable on Synthroid    Chronic low back pain with radiation to LLE  *Chronic and stable on Gabapentin, prn Tylenol    Depression  *Chronic and stable on Zoloft and Wellbutrin    GERD  *Chronic and stable on PPI     FEN: no IVFs, lytes stable, regular diet today, NPO at midnight in the event surgery is required  DVT Prophylaxis: PCDs  Code Status: Full Code    Disposition: Discharge date unclear at this time, pending ongoing workup and  "response to antibiotics, and whether or not patient will require surgery.  Anticipate discharge home in 2-3 days    Medically Ready for Discharge: Anticipated in 2-4 Days      Aura Mcknight, DO    Clinically Significant Risk Factors Present on Admission                 # Drug Induced Platelet Defect: home medication list includes an antiplatelet medication             # Overweight: Estimated body mass index is 25.69 kg/m  as calculated from the following:    Height as of this encounter: 1.575 m (5' 2\").    Weight as of this encounter: 63.7 kg (140 lb 6.9 oz).         # Financial/Environmental Concerns:             Medical Decision Making       Please see A&P for additional details of medical decision making.           Interval History   Chart reviewed. Seen this afternoon. Feeling okay. Ongoing swelling/erythema in L hand not significantly changed from yesterday. Able to make a fist with some mild discomfort. No proximal extension of erythema/swelling/pain. No systemic symptoms    -Data reviewed today: I reviewed all new labs and imaging results over the last 24 hours. I personally reviewed no images or EKG's today.    Physical Exam   Temp: 99.2  F (37.3  C) Temp src: Oral BP: 108/52 Pulse: 82   Resp: 16 SpO2: 94 % O2 Device: None (Room air)    Vitals:    05/03/25 1052 05/03/25 1943   Weight: 63.5 kg (140 lb) 63.7 kg (140 lb 6.9 oz)     Vital Signs with Ranges  Temp:  [98  F (36.7  C)-99.2  F (37.3  C)] 99.2  F (37.3  C)  Pulse:  [78-83] 82  Resp:  [16-18] 16  BP: (108-136)/(51-69) 108/52  SpO2:  [93 %-94 %] 94 %  I/O last 3 completed shifts:  In: 450 [P.O.:450]  Out: -     Constitutional: Resting comfortably, alert and conversing appropriately, NAD  Respiratory: CTAB, no wheeze, no increased work of breathing  Cardiovascular: HRRR, no MGR, no LE edema  GI: S, NT, ND, +BS  Skin/Integumen: faint erythema and ++swelling on dorsum of L hand and thenar eminence with extension into thumb/index finger; able to " make a fist without pain, ROM intact in L wrist  Other:      Medications   Current Facility-Administered Medications   Medication Dose Route Frequency Provider Last Rate Last Admin     Current Facility-Administered Medications   Medication Dose Route Frequency Provider Last Rate Last Admin    acetaminophen (TYLENOL) tablet 975 mg  975 mg Oral TID Emi Cedillo PA-C   975 mg at 05/05/25 0834    aspirin (ASA) chewable tablet 81 mg  81 mg Oral Daily Emi Cedillo PA-C   81 mg at 05/05/25 0834    atorvastatin (LIPITOR) tablet 40 mg  40 mg Oral Daily Emi Cedillo PA-C   40 mg at 05/05/25 0834    buPROPion (WELLBUTRIN XL) 24 hr tablet 150 mg  150 mg Oral QAM Emi Cedillo PA-C   150 mg at 05/05/25 0833    ceFAZolin (ANCEF) 1 g vial to attach to  ml bag for ADULT or 50 ml bag for PEDS  1 g Intravenous Q8H Emi Cedillo PA-C 200 mL/hr at 05/04/25 2354 1 g at 05/05/25 0835    fluticasone-vilanterol (BREO ELLIPTA) 100-25 MCG/ACT inhaler 1 puff  1 puff Inhalation Daily Emi Cedillo PA-C   1 puff at 05/05/25 0836    And    umeclidinium (INCRUSE ELLIPTA) 62.5 MCG/ACT inhaler 1 puff  1 puff Inhalation Daily Emi Cedillo PA-C   1 puff at 05/05/25 0836    gabapentin (NEURONTIN) capsule 300 mg  300 mg Oral Daily at 4 pm Emi Cedillo PA-C   300 mg at 05/04/25 1629    gabapentin (NEURONTIN) capsule 600 mg  600 mg Oral BID Emi Cedillo PA-C   600 mg at 05/05/25 0833    levothyroxine (SYNTHROID/LEVOTHROID) tablet 50 mcg  50 mcg Oral Daily Emi Cedillo PA-C   50 mcg at 05/05/25 0834    sertraline (ZOLOFT) tablet 150 mg  150 mg Oral Daily Emi Cedillo PA-C   150 mg at 05/05/25 0834    sodium chloride (PF) 0.9% PF flush 3 mL  3 mL Intracatheter Q8H YOCASTA Aura Mcknight,    3 mL at 05/05/25 0844       Data   Recent Labs   Lab 05/04/25  0551 05/03/25  1121   WBC 8.2 11.4*   HGB 11.1* 12.1   MCV 88 87    326    140   POTASSIUM 3.6  3.8   CHLORIDE 104 101   CO2 25 26   BUN 9.1 8.9   CR 0.84 0.97*   ANIONGAP 11 13   FRANCINE 8.8 9.3   * 116*       No results found for this or any previous visit (from the past 24 hours).

## 2025-05-05 NOTE — PLAN OF CARE
PRIMARY Concern: L hand cellulitis   SAFETY RISK Concerns (fall risk, behaviors, etc.): Fall       Aggression Tool Color: Green  Isolation/Type: none  Tests/Procedures for NEXT shift: MRI  Consults? (Pending/following, signed-off?) Ortho following   Where is patient from? (Home, TCU, etc.): [facility]  Other Important info for NEXT shift: PRN valium for before MRI (claustrophobia)   Anticipated DC date & active delays: TBD, 2-4 days    SUMMARY NOTE:   Orientation/Cognitive: A&Ox4, [place], forgetful  Observation Goals (Met/ Not Met): Inpatient   Mobility Level/Assist Equipment: SBA/W  Antibiotics & Plan (IV/po, length of tx left): IV Ancef q8 hrs  Pain Management: L hand pain managed with ice packs and tylenol.  Complete Pain Reassessment: Y Due next: Next shift 3:15pm  Tele/VS/O2: VSS on RA  ABNL Lab/BG: CRP 71.62, sed rate 54  Diet: Regular -tolerated  Bowel/Bladder: Continent, BM yesterday  Skin Concerns: Scattered bruising. pink, swelling and warmth to L hand, no change  Drains/Devices: PIV SL

## 2025-05-05 NOTE — PROGRESS NOTES
Orthopedic Surgery  Shae Chinchilla  05/05/2025     Admit Date:  5/3/2025  Assessment:   Left hand cellulitis with pain along the dorsal radial carpal region and thenar eminence.     Patient resting comfortably in bed.    She reports her pain is similar to yesterday.   Tolerating oral intake.    Denies nausea or vomiting  Denies chest pain or shortness of breath    Temp:  [98  F (36.7  C)-99.2  F (37.3  C)] 99.2  F (37.3  C)  Pulse:  [77-83] 82  Resp:  [16-18] 16  BP: (108-136)/(51-69) 108/52  SpO2:  [93 %-94 %] 94 %    Alert and oriented  On examination of the left upper extremity and hand. There is mild erythema involving the left hand dorsal and palmar aspect along the proximal thumb/CMC joint region. She has soft tissue swelling within the thenar eminence and dorsal radial wrist region which is tender to palpation. No wrist effusion, other swelling noted. She is able to make a fist without pain. She does not have pain on palpation of the flexor or extensor tendons. She has no pain on passive stretching. She has no fusiform swelling. No palpable abscess. Able to passively range the wrist without pain.  Sensation intact and pulses present.     Labs:  Recent Labs   Lab Test 05/04/25  0551 05/03/25  1121 09/14/24  1229   WBC 8.2 11.4* 9.8   HGB 11.1* 12.1 11.1*    326 215     No lab results found.  Recent Labs   Lab Test 05/05/25  0744 05/04/25  0551 05/03/25  1121   CRPI 71.62* 77.72* 32.95*         Plan:   CRP is only slightly improved today.  Continue to trend.    MRI of left hand ordered to evaluate for for fluid collection   Elevate hand at rest   Continue IV abx    Will place NPO midnight while awaiting MR results      Disposition:    Continue cares     Lucero Grady PA-C

## 2025-05-05 NOTE — PLAN OF CARE
PRIMARY Concern: L hand cellulitis   SAFETY RISK Concerns (fall risk, behaviors, etc.): Fall       Aggression Tool Color: Green  Isolation/Type: Green   Tests/Procedures for NEXT shift: None  Consults? (Pending/following, signed-off?) Ortho following   Where is patient from? (Home, TCU, etc.): [facility]  Other Important info for NEXT shift: None  Anticipated DC date & active delays: TBD  ___________________________________________________________________________  SUMMARY NOTE:   Orientation/Cognitive: AOx4, [place], forgetful  Observation Goals (Met/ Not Met): Inpatient   Mobility Level/Assist Equipment: SBA/W  Antibiotics & Plan (IV/po, length of tx left): IV Ancef q8 hrs  Pain Management: C/o 3/10 L hand pain, on scheduled Tylenol. PRN Oxy and Dilaudid available   Complete Pain Reassessment: Y Due next: Next shift   Tele/VS/O2: VSS on RA  ABNL Lab/BG: CRP 77.72; Hgb 11.1  Diet: Regular   Bowel/Bladder: Continent, BM 5/4  Skin Concerns: Scattered bruising. Redness, swelling and warmth to L hand  Drains/Devices: PIV SL   Patient Stated Goal for Today: Rest

## 2025-05-06 ENCOUNTER — APPOINTMENT (OUTPATIENT)
Dept: GENERAL RADIOLOGY | Facility: CLINIC | Age: 80
End: 2025-05-06
Attending: PHYSICIAN ASSISTANT
Payer: COMMERCIAL

## 2025-05-06 ENCOUNTER — APPOINTMENT (OUTPATIENT)
Dept: MRI IMAGING | Facility: CLINIC | Age: 80
End: 2025-05-06
Attending: PHYSICIAN ASSISTANT
Payer: COMMERCIAL

## 2025-05-06 LAB
ANION GAP SERPL CALCULATED.3IONS-SCNC: 12 MMOL/L (ref 7–15)
BUN SERPL-MCNC: 10 MG/DL (ref 8–23)
CALCIUM SERPL-MCNC: 8.6 MG/DL (ref 8.8–10.4)
CHLORIDE SERPL-SCNC: 105 MMOL/L (ref 98–107)
CREAT SERPL-MCNC: 0.9 MG/DL (ref 0.51–0.95)
CRP SERPL-MCNC: 47.57 MG/L
EGFRCR SERPLBLD CKD-EPI 2021: 66 ML/MIN/1.73M2
GLUCOSE SERPL-MCNC: 102 MG/DL (ref 70–99)
HCO3 SERPL-SCNC: 24 MMOL/L (ref 22–29)
POTASSIUM SERPL-SCNC: 3.7 MMOL/L (ref 3.4–5.3)
SODIUM SERPL-SCNC: 141 MMOL/L (ref 135–145)

## 2025-05-06 PROCEDURE — 77002 NEEDLE LOCALIZATION BY XRAY: CPT

## 2025-05-06 PROCEDURE — 36415 COLL VENOUS BLD VENIPUNCTURE: CPT | Performed by: INTERNAL MEDICINE

## 2025-05-06 PROCEDURE — 255N000002 HC RX 255 OP 636: Performed by: PHYSICIAN ASSISTANT

## 2025-05-06 PROCEDURE — 250N000013 HC RX MED GY IP 250 OP 250 PS 637: Performed by: PHYSICIAN ASSISTANT

## 2025-05-06 PROCEDURE — A9585 GADOBUTROL INJECTION: HCPCS | Performed by: PHYSICIAN ASSISTANT

## 2025-05-06 PROCEDURE — 86140 C-REACTIVE PROTEIN: CPT | Performed by: INTERNAL MEDICINE

## 2025-05-06 PROCEDURE — 250N000011 HC RX IP 250 OP 636: Performed by: PHYSICIAN ASSISTANT

## 2025-05-06 PROCEDURE — 89050 BODY FLUID CELL COUNT: CPT | Performed by: PHYSICIAN ASSISTANT

## 2025-05-06 PROCEDURE — 82565 ASSAY OF CREATININE: CPT | Performed by: INTERNAL MEDICINE

## 2025-05-06 PROCEDURE — 87075 CULTR BACTERIA EXCEPT BLOOD: CPT | Performed by: PHYSICIAN ASSISTANT

## 2025-05-06 PROCEDURE — 250N000013 HC RX MED GY IP 250 OP 250 PS 637: Performed by: INTERNAL MEDICINE

## 2025-05-06 PROCEDURE — 99232 SBSQ HOSP IP/OBS MODERATE 35: CPT | Performed by: INTERNAL MEDICINE

## 2025-05-06 PROCEDURE — 250N000009 HC RX 250: Performed by: PHYSICIAN ASSISTANT

## 2025-05-06 PROCEDURE — 87070 CULTURE OTHR SPECIMN AEROBIC: CPT | Performed by: PHYSICIAN ASSISTANT

## 2025-05-06 PROCEDURE — 73219 MRI UPPER EXTREMITY W/DYE: CPT | Mod: LT

## 2025-05-06 PROCEDURE — 120N000001 HC R&B MED SURG/OB

## 2025-05-06 RX ORDER — LIDOCAINE HYDROCHLORIDE 10 MG/ML
5 INJECTION, SOLUTION EPIDURAL; INFILTRATION; INTRACAUDAL; PERINEURAL ONCE
Status: COMPLETED | OUTPATIENT
Start: 2025-05-06 | End: 2025-05-06

## 2025-05-06 RX ORDER — ETHYL CHLORIDE 100 %
1 AEROSOL, SPRAY (ML) TOPICAL ONCE
Status: DISCONTINUED | OUTPATIENT
Start: 2025-05-06 | End: 2025-05-06

## 2025-05-06 RX ORDER — GADOBUTROL 604.72 MG/ML
6 INJECTION INTRAVENOUS ONCE
Status: COMPLETED | OUTPATIENT
Start: 2025-05-06 | End: 2025-05-06

## 2025-05-06 RX ADMIN — GADOBUTROL 6 ML: 604.72 INJECTION INTRAVENOUS at 03:08

## 2025-05-06 RX ADMIN — ACETAMINOPHEN 975 MG: 325 TABLET, FILM COATED ORAL at 21:02

## 2025-05-06 RX ADMIN — SERTRALINE HYDROCHLORIDE 150 MG: 100 TABLET ORAL at 09:23

## 2025-05-06 RX ADMIN — ACETAMINOPHEN 975 MG: 325 TABLET, FILM COATED ORAL at 09:24

## 2025-05-06 RX ADMIN — FLUTICASONE FUROATE AND VILANTEROL TRIFENATATE 1 PUFF: 100; 25 POWDER RESPIRATORY (INHALATION) at 09:23

## 2025-05-06 RX ADMIN — ATORVASTATIN CALCIUM 40 MG: 40 TABLET, FILM COATED ORAL at 09:23

## 2025-05-06 RX ADMIN — ASPIRIN 81 MG CHEWABLE TABLET 81 MG: 81 TABLET CHEWABLE at 09:23

## 2025-05-06 RX ADMIN — CEFAZOLIN 1 G: 1 INJECTION, POWDER, FOR SOLUTION INTRAMUSCULAR; INTRAVENOUS at 01:03

## 2025-05-06 RX ADMIN — BUPROPION HYDROCHLORIDE 150 MG: 150 TABLET, EXTENDED RELEASE ORAL at 09:24

## 2025-05-06 RX ADMIN — GABAPENTIN 600 MG: 300 CAPSULE ORAL at 21:02

## 2025-05-06 RX ADMIN — CEFAZOLIN 1 G: 1 INJECTION, POWDER, FOR SOLUTION INTRAMUSCULAR; INTRAVENOUS at 16:21

## 2025-05-06 RX ADMIN — LEVOTHYROXINE SODIUM 50 MCG: 50 TABLET ORAL at 09:24

## 2025-05-06 RX ADMIN — GABAPENTIN 600 MG: 300 CAPSULE ORAL at 09:25

## 2025-05-06 RX ADMIN — CEFAZOLIN 1 G: 1 INJECTION, POWDER, FOR SOLUTION INTRAMUSCULAR; INTRAVENOUS at 09:20

## 2025-05-06 RX ADMIN — ACETAMINOPHEN 975 MG: 325 TABLET, FILM COATED ORAL at 13:30

## 2025-05-06 RX ADMIN — GABAPENTIN 300 MG: 300 CAPSULE ORAL at 16:21

## 2025-05-06 RX ADMIN — UMECLIDINIUM 1 PUFF: 62.5 AEROSOL, POWDER ORAL at 09:23

## 2025-05-06 RX ADMIN — DIAZEPAM 5 MG: 5 TABLET ORAL at 01:55

## 2025-05-06 RX ADMIN — LIDOCAINE HYDROCHLORIDE 1.5 ML: 10 INJECTION, SOLUTION EPIDURAL; INFILTRATION; INTRACAUDAL; PERINEURAL at 08:41

## 2025-05-06 ASSESSMENT — ACTIVITIES OF DAILY LIVING (ADL)
ADLS_ACUITY_SCORE: 57
ADLS_ACUITY_SCORE: 53
ADLS_ACUITY_SCORE: 54
ADLS_ACUITY_SCORE: 53
ADLS_ACUITY_SCORE: 54
ADLS_ACUITY_SCORE: 53
ADLS_ACUITY_SCORE: 57
ADLS_ACUITY_SCORE: 53

## 2025-05-06 NOTE — PROGRESS NOTES
Orthopedic Surgery  Shae Chinchilla  05/06/2025     Admit Date:  5/3/2025    Left hand cellulitis with pain along the dorsal radial carpal region and thenar eminence      Patient resting comfortably in bed.   Aspiration has been completed, patient reports she is better 'now that that is done'   Tolerating oral intake.    Denies nausea or vomiting.  Denies chest pain or shortness of breath.  Denies history of gouty process    Temp:  [97.3  F (36.3  C)-99.2  F (37.3  C)] 98  F (36.7  C)  Pulse:  [71-82] 75  Resp:  [16] 16  BP: (108-137)/(52-77) 116/58  SpO2:  [94 %-95 %] 94 %    Alert and oriented.   Left thumb band-aid is clean, dry, and intact.   Minimal erythema of the surrounding skin.   Left upper extremity is NVI.  Remains tender to palpation thenar eminence  Able to flex, extend, abduct, and adduct digits, but limits thumb motion  Radial pulse palpable.  Digits are warm.  Sensation intact to light touch in the axillary, median, ulnar, and radial nerve distributions.    Labs/Imaging:  Recent Labs   Lab Test 05/04/25  0551 05/03/25  1121 09/14/24  1229   WBC 8.2 11.4* 9.8   HGB 11.1* 12.1 11.1*    326 215     No lab results found.  Recent Labs   Lab Test 05/06/25  0733 05/05/25  0744 05/04/25  0551   CRPI 47.57* 71.62* 77.72*       A/P      -CRP is trending down, continue to trend  -MRI completed shows no abscess, but effusion at 1st CMC joint  -Aspiration of left 1st CMC joint ordered with cell count, crystals, gram stain, aerobic and anaerobic cultures   -Surgery not planned for today, may have a diet, order placed  -Will make NPO at midnight, in the event gram stain/cell count/cultures are positive  -Elevate hand at rest  -Continue IV antibiotics  -Continue current pain regimen.      2.  Disposition  -Anticipate d/c to be determined    Panfilo Bond PA-C  [facility]

## 2025-05-06 NOTE — PLAN OF CARE
Goal Outcome Evaluation:      Plan of Care Reviewed With: patient    Overall Patient Progress: improving    PRIMARY Concern: L hand cellulitis   SAFETY RISK Concerns (fall risk, behaviors, etc.): N/A     Aggression Tool Color: Green  Isolation/Type: none  Tests/Procedures for NEXT shift:   Consults? (Pending/following, signed-off?) Ortho following   Where is patient from? (Home, TCU, etc.): [facility]  Other Important info for NEXT shift: Hand MRI completed- results pending  Anticipated DC date & active delays: TBD, 2-4 days    SUMMARY NOTE:   Orientation/Cognitive: A&Ox4, [place], forgetful  Observation Goals (Met/ Not Met): Inpatient   Mobility Level/Assist Equipment: IND/SBA/W  Antibiotics & Plan (IV/po, length of tx left): IV Ancef q8 hrs  Pain Management: Denies pain  Tele/VS/O2: VSS on RA  ABNL Lab/BG: CRP 71.62, sed rate 54  Diet: NPO @ 0000  Bowel/Bladder: Continent  Skin Concerns: Scattered bruising. pink, swelling and warmth to L hand, improving per patient  Drains/Devices: PIV SL   Patient Stated Goal for Today: Rest

## 2025-05-06 NOTE — PLAN OF CARE
Goal Outcome Evaluation:       Shift Summary [id number]    Admitting Diagnosis: Cellulitis of left hand [L03.114]   Vitals WNL  Pain denies  A&Ox4  Voiding WNL  Mobility IND  CMS intact, reports some diminished sensation in the soles of her feet which have been present for a while  Lung Sounds clear on RA  GI WNL  Dressing CDI, band aid over aspiration site     Orders Placed This Encounter      Regular Diet Adult      NPO for Procedure/Surgery per Anesthesia Guidelines Except for: Meds; Clear liquids before procedure/surgery: ADULT (Age GREATER than or Equal to 18 years) - Clear liquids 2 hours before procedure/surgery       Plan: discharge pending, possibly tomorrow

## 2025-05-06 NOTE — PROGRESS NOTES
Lab called and notified that they wont be able to run the crystals and cellcount from the fluid aspirated from the L tumb due to insufficient amount. They will run the aerobic and anaerobic culture..

## 2025-05-06 NOTE — PROGRESS NOTES
PRIMARY Concern: L hand cellulitis   SAFETY RISK Concerns (fall risk, behaviors, etc.): Fall       Aggression Tool Color: Green  Isolation/Type: none  Tests/Procedures for NEXT shift: MRI pending   Consults? (Pending/following, signed-off?) Ortho following   Where is patient from? (Home, TCU, etc.): [facility]  Other Important info for NEXT shift: PRN valium for before MRI (claustrophobia)   Anticipated DC date & active delays: TBD, 2-4 days    SUMMARY NOTE:   Orientation/Cognitive: A&Ox4, [place], forgetful  Observation Goals (Met/ Not Met): Inpatient   Mobility Level/Assist Equipment: IND/SBA/W  Antibiotics & Plan (IV/po, length of tx left): IV Ancef q8 hrs  Pain Management: L hand pain managed with ice packs and tylenol.  Tele/VS/O2: VSS on RA  ABNL Lab/BG: CRP 71.62, sed rate 54  Diet: Regular -tolerated, NPO @ midnight  Bowel/Bladder: Continent, BM yesterday  Skin Concerns: Scattered bruising. pink, swelling and warmth to L hand, no change  Drains/Devices: PIV SL   Patient Stated Goal for Today: Rest

## 2025-05-06 NOTE — PROGRESS NOTES
RADIOLOGY PROCEDURE NOTE  Patient name: Shae Chinchilla  MRN: [id number]  : 1945    Pre-procedure diagnosis: Left thumb pain and effusion on MRI  Post-procedure diagnosis: Same    Procedure Date/Time: May 6, 2025  8:59 AM  Procedure: Left 1st CMC joint aspiration.  Estimated blood loss: None  Specimen(s) collected with description: 0.75 ml of cloudy yellow fluid  The patient tolerated the procedure well with no immediate complications.  Significant findings:none    See imaging dictation for procedural details.    Provider name: Lupillo Henriquez PA-C  Assistant(s):None

## 2025-05-06 NOTE — PROGRESS NOTES
[facility]    Hospitalist Progress Note    Date of Admission: 5/3/2025    Assessment & Plan   Shae Chinchilla is a 79 year old female with PMHx of depression, hypothyroidism, chronic low back pain, COPD, HLD, hx CVA, hx substance abuse in remission, hx PFO who was admitted on 5/3/2025 with LUE cellulitis.    Left hand cellulitis  *Presented with left hand pain, redness, warmth and swelling x 2 days. Last night, her thumb and thenar eminence was especially painful, prompting evaluation. No recent fall/trauma, no animal scratches or bites.   *In ED, was afebrile and VSS. WBC 11.4, CRP 32. Xray w/ DJD, no fracture or erosive changes. Bedside US was negative for abscess. Started on IV Ancef on admission.   *Ortho following. Although WBC quickly normalized, CRP trended up and remained elevated 5/4-5/5. MRI left hand obtained 5/5, no abscess identified but had 1st CMC (thumb) joint effusion/synovitis that could represent septic arthritis. Subsequently underwent joint aspiration of the L thumb per IR on 5/6.   -- await gm stain/culture on synovial fluid (not enough fluid to send for cell count or crystal analysis)  -- blood cultures from admission remain neg  -- CRP now trending down  -- cont IV Ancef (5/3-present) -- anticipate transition to oral abx at discharge  -- cont sched Tylenol, prn oxycodone for pain    Severe coronary calcifications  Hyperlipidemia  *Noted on CT chest 3/2023 and seen by cardiology 6/2023.   *Chronic and stable on ASA, statin.    History of PFO  *Per cardiology note from 6/6/2023, did not recommend PFO closure    History of caudate stroke  *Prior incidental finding on MRI obtained for work of memory complaint. Previously seen by neurology 1/17/20 through [facility]. Chronic and stable on ASA, statin.     COPD  *Chronic and stable on home inhalers    Hypothyroidism  *Chronic and stable on Synthroid    Chronic low back pain with radiation to LLE  *Chronic and stable on  "Gabapentin, prn Tylenol    Depression  *Chronic and stable on Zoloft and Wellbutrin    GERD  *Chronic and stable on PPI     FEN: no IVFs, lytes stable, regular diet today, will again be kept NPO at midnight in the event surgery is required  DVT Prophylaxis: PCDs  Code Status: Full Code    Disposition: Discharge date unclear, pending whether patient will require surgery; possibly tomorrow if cultures remain negative.     Medically Ready for Discharge: Anticipated Tomorrow      Aura Mcknight, DO    Clinically Significant Risk Factors                              # Overweight: Estimated body mass index is 25.69 kg/m  as calculated from the following:    Height as of this encounter: 1.575 m (5' 2\").    Weight as of this encounter: 63.7 kg (140 lb 6.9 oz)., PRESENT ON ADMISSION       # Financial/Environmental Concerns:             Medical Decision Making       Please see A&P for additional details of medical decision making.           Interval History   Chart reviewed. Uneventful night. Underwent joint aspiration per IR this AM. I stopped by to see her around noon and she was sleeping comfortably. Easily woke to name. Denied complaints. Notes left hand swelling/erythema seems improved today.     -Data reviewed today: I reviewed all new labs and imaging results over the last 24 hours. I personally reviewed no images or EKG's today.    Physical Exam   Temp: 98  F (36.7  C) Temp src: Oral BP: 116/58 Pulse: 75   Resp: 16 SpO2: 94 % O2 Device: None (Room air)    Vitals:    05/03/25 1052 05/03/25 1943   Weight: 63.5 kg (140 lb) 63.7 kg (140 lb 6.9 oz)     Vital Signs with Ranges  Temp:  [97.3  F (36.3  C)-98  F (36.7  C)] 98  F (36.7  C)  Pulse:  [71-82] 75  Resp:  [16] 16  BP: (116-137)/(58-77) 116/58  SpO2:  [94 %-95 %] 94 %  I/O last 3 completed shifts:  In: 1200 [P.O.:1200]  Out: -     Constitutional: Resting comfortably, alert and conversing appropriately, NAD  Respiratory: CTAB, no wheeze, no increased work of " breathing  Cardiovascular: HRRR, no MGR, no LE edema  GI: S, NT, ND, +BS  Skin/Integumen: previously noted erythema/swelling of dorsum of L hand, thenar eminence and thumb/index finger much improved, able to make a fist without pain, intact ROM of L wrist  Other:      Medications   Current Facility-Administered Medications   Medication Dose Route Frequency Provider Last Rate Last Admin     Current Facility-Administered Medications   Medication Dose Route Frequency Provider Last Rate Last Admin    acetaminophen (TYLENOL) tablet 975 mg  975 mg Oral TID Emi Cedillo PA-C   975 mg at 05/06/25 0924    aspirin (ASA) chewable tablet 81 mg  81 mg Oral Daily Emi Cedillo PA-C   81 mg at 05/06/25 0923    atorvastatin (LIPITOR) tablet 40 mg  40 mg Oral Daily Emi Cedillo PA-C   40 mg at 05/06/25 0923    buPROPion (WELLBUTRIN XL) 24 hr tablet 150 mg  150 mg Oral QAM Emi Cedillo PA-C   150 mg at 05/06/25 0924    ceFAZolin (ANCEF) 1 g vial to attach to  ml bag for ADULT or 50 ml bag for PEDS  1 g Intravenous Q8H Emi Cedillo PA-C 200 mL/hr at 05/04/25 2354 1 g at 05/06/25 0920    fluticasone-vilanterol (BREO ELLIPTA) 100-25 MCG/ACT inhaler 1 puff  1 puff Inhalation Daily Emi Cedillo PA-C   1 puff at 05/06/25 0923    And    umeclidinium (INCRUSE ELLIPTA) 62.5 MCG/ACT inhaler 1 puff  1 puff Inhalation Daily Emi Cedillo PA-C   1 puff at 05/06/25 0923    gabapentin (NEURONTIN) capsule 300 mg  300 mg Oral Daily at 4 pm Emi Cedillo PA-C   300 mg at 05/05/25 1545    gabapentin (NEURONTIN) capsule 600 mg  600 mg Oral BID Emi Cedillo PA-C   600 mg at 05/06/25 0925    levothyroxine (SYNTHROID/LEVOTHROID) tablet 50 mcg  50 mcg Oral Daily Emi Cedillo PA-C   50 mcg at 05/06/25 0924    sertraline (ZOLOFT) tablet 150 mg  150 mg Oral Daily Emi Cedillo PA-C   150 mg at 05/06/25 0923    sodium chloride (PF) 0.9% PF flush 10 mL  10 mL  INTRA-ARTICULAR Once Torri Bond PA-C        sodium chloride (PF) 0.9% PF flush 3 mL  3 mL Intracatheter Q8H [facility] Aura Mcknight, DO   3 mL at 05/06/25 0920       Data   Recent Labs   Lab 05/06/25  0733 05/04/25  0551 05/03/25  1121   WBC  --  8.2 11.4*   HGB  --  11.1* 12.1   MCV  --  88 87   PLT  --  311 326    140 140   POTASSIUM 3.7 3.6 3.8   CHLORIDE 105 104 101   CO2 24 25 26   BUN 10.0 9.1 8.9   CR 0.90 0.84 0.97*   ANIONGAP 12 11 13   FRANCINE 8.6* 8.8 9.3   * 116* 116*       Recent Results (from the past 24 hours)   MR Hand Left w Contrast    Narrative    EXAM: MR HAND LEFT W CONTRAST  LOCATION: [facility]  DATE: 5/6/2025    INDICATION: Left hand cellulitis, evaluate for fluid collection  COMPARISON: None.  TECHNIQUE: Postgadolinium T1 sequences were obtained.  IV CONTRAST: 6mL Gadavist    FINDINGS:     Examination is limited due to extensive patient motion artifact despite repeat sequence acquisition.    There is subcutaneous soft tissue edema and enhancement over the hand/wrist most pronounced over the thenar region. This is nonspecific but can be seen with cellulitis in the appropriate clinical setting. No drainable soft tissue fluid collection.    There are scattered degenerative changes most pronounced at the thumb CMC joint where they appear advanced with full-thickness cartilage loss, severe joint space narrowing, and some articular surface remodeling. There are more moderate degenerative   changes involving the thumb MCP joint.    There is a thumb CMC joint effusion/synovitis which could be reactive to the degenerative changes although septic arthritis could have a similar appearance on imaging. No evidence for osteomyelitis.    No tendon tear or definite tenosynovitis.      Impression    IMPRESSION:  1.  Examination is limited due to extensive patient motion artifact despite repeat sequence acquisition.  2.  Subcutaneous soft tissue  edema and enhancement over the hand/wrist most pronounced over the thenar region. This is nonspecific but can be seen with cellulitis in the appropriate clinical setting. No drainable soft tissue fluid collection.  3.  Advanced thumb CMC degenerative changes with a joint effusion/synovitis which could be reactive to the degenerative changes although septic arthritis could have a similar appearance on imaging.  4.  No evidence for osteomyelitis.               XR Joint Aspiration Small Left    Narrative    EXAM: FLUOROSCOPICALLY GUIDED LEFT FIRST CMC JOINT ASPIRATION   LOCATION: [facility]  DATE: 5/6/2025     INDICATION: joint effusion, cellulitis. MRI evidence of fluid in the left first CMC.    RADIATION DOSE: Dose Area Product 0.56 microGy-m2    PROCEDURE: Procedure and risks explained and consent received. The skin was prepped and draped in sterile fashion. A small amount of 1% lidocaine was infused into the local soft tissues.     Under direct fluoroscopic guidance, a 25-gauge needle was introduced into the joint space.     COMPLICATIONS: None.    SPECIMEN: 0.75 mL of cloudy yellow fluid was aspirated and sent to lab.      Impression    IMPRESSION: Successful image-guided left first CMC joint aspiration. Sample sent to the lab for analysis.    Reference CPT Codes: 12783, 61239

## 2025-05-07 VITALS
HEART RATE: 76 BPM | HEIGHT: 62 IN | DIASTOLIC BLOOD PRESSURE: 69 MMHG | TEMPERATURE: 98.7 F | SYSTOLIC BLOOD PRESSURE: 127 MMHG | OXYGEN SATURATION: 96 % | BODY MASS INDEX: 25.84 KG/M2 | RESPIRATION RATE: 18 BRPM | WEIGHT: 140.43 LBS

## 2025-05-07 LAB — CRP SERPL-MCNC: 21.69 MG/L

## 2025-05-07 PROCEDURE — 250N000013 HC RX MED GY IP 250 OP 250 PS 637: Performed by: PHYSICIAN ASSISTANT

## 2025-05-07 PROCEDURE — 36415 COLL VENOUS BLD VENIPUNCTURE: CPT | Performed by: INTERNAL MEDICINE

## 2025-05-07 PROCEDURE — 250N000011 HC RX IP 250 OP 636: Performed by: PHYSICIAN ASSISTANT

## 2025-05-07 PROCEDURE — 99239 HOSP IP/OBS DSCHRG MGMT >30: CPT | Performed by: INTERNAL MEDICINE

## 2025-05-07 PROCEDURE — 86140 C-REACTIVE PROTEIN: CPT | Performed by: INTERNAL MEDICINE

## 2025-05-07 RX ORDER — CEPHALEXIN 500 MG/1
500 CAPSULE ORAL 3 TIMES DAILY
Qty: 9 CAPSULE | Refills: 0 | Status: SHIPPED | OUTPATIENT
Start: 2025-05-07 | End: 2025-05-10

## 2025-05-07 RX ORDER — ACETAMINOPHEN 500 MG
1000 TABLET ORAL EVERY 6 HOURS PRN
COMMUNITY
Start: 2025-05-07

## 2025-05-07 RX ADMIN — LEVOTHYROXINE SODIUM 50 MCG: 50 TABLET ORAL at 07:50

## 2025-05-07 RX ADMIN — ATORVASTATIN CALCIUM 40 MG: 40 TABLET, FILM COATED ORAL at 07:49

## 2025-05-07 RX ADMIN — SERTRALINE HYDROCHLORIDE 150 MG: 100 TABLET ORAL at 07:50

## 2025-05-07 RX ADMIN — ACETAMINOPHEN 975 MG: 325 TABLET, FILM COATED ORAL at 07:49

## 2025-05-07 RX ADMIN — FLUTICASONE FUROATE AND VILANTEROL TRIFENATATE 1 PUFF: 100; 25 POWDER RESPIRATORY (INHALATION) at 07:49

## 2025-05-07 RX ADMIN — GABAPENTIN 600 MG: 300 CAPSULE ORAL at 07:49

## 2025-05-07 RX ADMIN — CEFAZOLIN 1 G: 1 INJECTION, POWDER, FOR SOLUTION INTRAMUSCULAR; INTRAVENOUS at 01:26

## 2025-05-07 RX ADMIN — UMECLIDINIUM 1 PUFF: 62.5 AEROSOL, POWDER ORAL at 07:49

## 2025-05-07 RX ADMIN — BUPROPION HYDROCHLORIDE 150 MG: 150 TABLET, EXTENDED RELEASE ORAL at 07:50

## 2025-05-07 RX ADMIN — ASPIRIN 81 MG CHEWABLE TABLET 81 MG: 81 TABLET CHEWABLE at 07:49

## 2025-05-07 RX ADMIN — CEFAZOLIN 1 G: 1 INJECTION, POWDER, FOR SOLUTION INTRAMUSCULAR; INTRAVENOUS at 07:52

## 2025-05-07 ASSESSMENT — ACTIVITIES OF DAILY LIVING (ADL)
ADLS_ACUITY_SCORE: 57

## 2025-05-07 NOTE — PLAN OF CARE
PRIMARY Concern: Pt admitted for L hand cellulitis.   POD #1 L thumb joint aspiration   SAFETY RISK Concerns (fall risk, behaviors, etc.): fall risk       Aggression Tool Color: green   Isolation/Type: none   Tests/Procedures for NEXT shift: AM labs , cultures pending   Consults? (Pending/following, signed-off?) ortho following   Where is patient from? (Home, TCU, etc.): [facility]  Other Important info for NEXT shift:   Anticipated DC date & active delays: today 5/7  _____________________________________________________________________________  SUMMARY NOTE:   Orientation/Cognitive: A/O x4. Forgetful. [place].   Observation Goals (Met/ Not Met): Inpt  Mobility Level/Assist Equipment: SBA/Ind   Antibiotics & Plan (IV/po, length of tx left): IV Ancef    Pain Management: denies   Complete Pain Reassessment: Y Due next: shift   Tele/VS/O2: VSS RA   ABNL Lab/BG: CRP trending, cultures pending   Diet: regular   Bowel/Bladder: continence  Skin Concerns: scattered bruises BUE and BLE. L thumb puncture site w/ band aid    Drains/Devices: PIV  Patient Stated Goal for Today: rest

## 2025-05-07 NOTE — PROGRESS NOTES
Orthopedic Surgery  Shae Chinchilla  05/07/2025     Admit Date:  5/3/2025    Left hand cellulitis with pain along the dorsal radial carpal region and thenar eminence      Patient resting comfortably in bed.   Patient reports her pain is much improved today.   Denies nausea or vomiting.  Denies chest pain or shortness of breath.  Denies history of gouty process    Temp:  [98  F (36.7  C)-98.7  F (37.1  C)] 98.7  F (37.1  C)  Pulse:  [68-79] 76  Resp:  [16-18] 18  BP: (120-131)/(58-69) 127/69  SpO2:  [94 %-96 %] 96 %    Alert and oriented  On examination of the left upper extremity and hand:  Erythema involving the left hand dorsal and palmar aspect along the proximal thumb/CMC joint region is much improved. Soft tissue swelling within the thenar eminence and dorsal radial wrist region has also improved. Denies tenderness to palpation and is able to range thumb without pain. No wrist effusion, other swelling noted. She is able to make a fist without pain. She does not have pain on palpation of the flexor or extensor tendons. She has no pain on passive stretching. No palpable abscess. Able to passively range the wrist without pain.  Sensation intact and pulses present    Labs/Imaging:  Recent Labs   Lab Test 05/04/25  0551 05/03/25  1121 09/14/24  1229   WBC 8.2 11.4* 9.8   HGB 11.1* 12.1 11.1*    326 215     No lab results found.  Recent Labs   Lab Test 05/07/25  0755 05/06/25  0733 05/05/25  0744   CRPI 21.69* 47.57* 71.62*       A/P      -CRP is trending down  -Aspiration of left 1st CMC joint: no organisms seen   -Elevate hand at rest  -Continue IV antibiotics while inpatient.   -Continue current pain regimen.  -No plan for surgical intervention.       2.  Disposition  Discharge to home per medicine.  Okay from ortho standpoint.     Lucero Grady PA-C   [facility]

## 2025-05-07 NOTE — PLAN OF CARE
Goal Outcome Evaluation:      Plan of Care Reviewed With: patient    Pt a/o. VSS. No pain. Tolerating reg diet. Voiding. Ambulating. Neymar paper work reviewed with pt. Verbalizes understanding. PIV removed. Take home meds given. Verified 5 rights of med. All questions answered. Follow up aware. Dc home with family

## 2025-05-07 NOTE — PROGRESS NOTES
"Care Coordination:    Writer noted that \"patient lives at [facility]\". Writer called [facility] at [phone]. They told writer she lives in [facility]. Writer then spoke to Jovana, nurse in [facility]. She tells writer that patient is in ILF and gets no services.     Heather Bergman RN  BSN, Care Coordinator    [facility]/ Care Transitions          [email or web address]    "

## 2025-05-07 NOTE — DISCHARGE SUMMARY
[facility]    Discharge Summary  Hospitalist    Date of Admission:  5/3/2025  Date of Discharge:   5/7/2025  Discharging Provider: Aura Mcknight,     Discharge Diagnoses   Left hand cellulitis  Severe coronary calcifications  Hyperlipidemia  History of PFO  History of caudate stroke  COPD  Hypothyroidism  Chronic low back pain with radiation to LLE  Depression  GERD    History of Present Illness   Shae Chinchilla is a 79 year old female with PMHx of depression, hypothyroidism, chronic low back pain, COPD, HLD, hx CVA, hx substance abuse in remission, hx PFO who was admitted on 5/3/2025 with LUE cellulitis.    Hospital Course   Shae Chinchilla was admitted on 5/3/2025.  The following problems were addressed during her hospitalization:    Left hand cellulitis  *Presented with left hand pain, redness, warmth and swelling x 2 days. Last night, her thumb and thenar eminence was especially painful, prompting evaluation. No recent fall/trauma, no animal scratches or bites.   *In ED, was afebrile and VSS. WBC 11.4, CRP 32. Xray w/ DJD, no fracture or erosive changes. Bedside US was negative for abscess. Started on IV Ancef on admission.   *Ortho following. Although WBC quickly normalized, CRP trended up and remained elevated 5/4-5/5. MRI left hand obtained 5/5, no abscess identified but had 1st CMC (thumb) joint effusion/synovitis that could represent septic arthritis. Subsequently underwent joint aspiration of the L thumb per IR on 5/6. Initial synovial fluid gram stain was negative for organisms (not enough fluid to send for cell count or crystal analysis).   *CRP trended down, blood cultures drawn on admission remained negative. Synovial fluid culture still pending at time of discharge. [facility]/PCP to follow up.   *Treated with IV Ancef during stay (5/3-/5/7), transitioned to Keflex at discharge with plans to complete 3 additional days of treatment (for 7d total treatment course).    *Sx managed with prn Tylenol. Activity as tolerated, keep elevated as able  *Follow up with PCP in the next 1-2 weeks.     Severe coronary calcifications  Hyperlipidemia  *Noted on CT chest 3/2023 and seen by cardiology 6/2023.   *Chronic and stable on ASA, statin.    History of PFO  *Per cardiology note from 6/6/2023, did not recommend PFO closure    History of caudate stroke  *Prior incidental finding on MRI obtained for work of memory complaint. Previously seen by neurology 1/17/20 through [facility]. Chronic and stable on ASA, statin.     COPD  *Chronic and stable on home inhalers    Hypothyroidism  *Chronic and stable on Synthroid    Chronic low back pain with radiation to LLE  *Chronic and stable on Gabapentin, prn Tylenol    Depression  *Chronic and stable on Zoloft and Wellbutrin    GERD  *Chronic and stable on PPI     Aura Mcknight DO    Pending Results   These results will be followed up by PCP/[facility]  Unresulted Labs Ordered in the Past 30 Days of this Admission       Date and Time Order Name Status Description    5/6/2025  7:41 AM Anaerobic Bacterial Culture Routine In process     5/6/2025  7:41 AM Aspirate Aerobic Bacterial Culture Routine With Gram Stain Preliminary     5/3/2025  3:13 PM Blood Culture Peripheral Blood Preliminary     5/3/2025  3:13 PM Blood Culture Peripheral Blood Preliminary             Code Status   Full Code       Primary Care Physician   Renetta Celaya    Physical Exam   Temp: 98.7  F (37.1  C) Temp src: Oral BP: 127/69 Pulse: 76   Resp: 18 SpO2: 96 % O2 Device: None (Room air)    Vitals:    05/03/25 1052 05/03/25 1943   Weight: 63.5 kg (140 lb) 63.7 kg (140 lb 6.9 oz)     Vital Signs with Ranges  Temp:  [98  F (36.7  C)-98.7  F (37.1  C)] 98.7  F (37.1  C)  Pulse:  [68-79] 76  Resp:  [16-18] 18  BP: (120-131)/(58-69) 127/69  SpO2:  [94 %-96 %] 96 %  No intake/output data recorded.    General: Resting comfortably, alert, conversive, NAD  CVS: HRRR, no MGR, no LE  edema  Respiratory: CTAB, no wheeze no increased work of breathing  GI: S, NT,ND, BS  Skin: previously noted erythema/swelling of dorsum of L hand, thenar eminence and thumb/index finger much improved, able to make a fist without pain, intact ROM of L wrist     Discharge Disposition   Discharged to home  Condition at discharge: Stable    Consultations This Hospital Stay   ORTHOPEDIC SURGERY IP CONSULT    Time Spent on this Encounter   I, Aura Mckngiht DO, personally saw the patient today and spent greater than 30 minutes discharging this patient.    Discharge Orders      Reason for your hospital stay    Management of the infection in your hand, for which you were treated with IV antibiotics.  You underwent further evaluation for orthopedic surgery service with MRI and joint aspiration to ensure no evidence of a deeper infection.  These test results were reassuring.  You will continue treatment of the infection in your skin with an oral antibiotic at discharge.     Activity    Your activity upon discharge: activity as tolerated     Diet    Follow this diet upon discharge: Regular     Hospital Follow-up with Existing Primary Care Provider (PCP)          Discharge Medications   Current Discharge Medication List        START taking these medications    Details   acetaminophen (TYLENOL) 500 MG tablet Take 2 tablets (1,000 mg) by mouth every 6 hours as needed for mild pain.    Associated Diagnoses: Cellulitis of left hand      cephALEXin (KEFLEX) 500 MG capsule Take 1 capsule (500 mg) by mouth 3 times daily for 3 days.  Qty: 9 capsule, Refills: 0    Associated Diagnoses: Cellulitis of left hand           CONTINUE these medications which have NOT CHANGED    Details   albuterol (PROAIR HFA/PROVENTIL HFA/VENTOLIN HFA) 108 (90 Base) MCG/ACT inhaler Inhale 1-2 puffs into the lungs every 6 hours as needed for shortness of breath, wheezing or cough.      alendronate (FOSAMAX) 70 MG tablet Take 70 mg by mouth every 7  days.      aspirin (ASA) 81 MG chewable tablet Take 81 mg by mouth daily.      atorvastatin (LIPITOR) 40 MG tablet Take 40 mg by mouth daily.      buPROPion (WELLBUTRIN XL) 150 MG 24 hr tablet Take 150 mg by mouth every morning.      cycloSPORINE (RESTASIS) 0.05 % ophthalmic emulsion Place 1 drop into both eyes 2 times daily.      Fluticasone-Umeclidin-Vilant (TRELEGY ELLIPTA) 100-62.5-25 MCG/ACT oral inhaler Inhale 1 puff into the lungs daily.      !! gabapentin (NEURONTIN) 300 MG capsule Take 600 mg by mouth 2 times daily.      !! gabapentin (NEURONTIN) 300 MG capsule Take 300 mg by mouth daily at 4pm.      levothyroxine (SYNTHROID/LEVOTHROID) 50 MCG tablet Take 50 mcg by mouth daily.      omeprazole (PRILOSEC) 20 MG DR capsule Take 20 mg by mouth daily as needed.      sertraline (ZOLOFT) 100 MG tablet Take 150 mg by mouth daily.       !! - Potential duplicate medications found. Please discuss with provider.        Allergies   No Known Allergies  Data   Results for orders placed or performed during the hospital encounter of 05/03/25   XR Hand Left G/E 3 Views    Narrative    EXAM: XR HAND LEFT G/E 3 VIEWS  LOCATION: [facility]  DATE: 5/3/2025    INDICATION: swelling about the thenar musculature   eval for foreign body, gas  COMPARISON: None.      Impression    IMPRESSION: Degenerative change at the STT joint and first CMC joint. Degenerative change first MCP joint. No evidence for fracture or dislocation. No erosive changes.   POC US SOFT TISSUE    Impression    Limited Soft Tissue Ultrasound, performed and interpreted by me.    Indication:  Skin redness warmth pain. Evaluate for cellulitis vs abscess.     Body location: left hand    Findings:  There is cobblestoning suggestive of cellulitis in the evaluated area. There is no fluid collection to suggest abscess. No foreign body identified    IMPRESSION: Cellulitis     MR Hand Left w Contrast    Narrative    EXAM: MR HAND LEFT W  CONTRAST  LOCATION: [facility]  DATE: 5/6/2025    INDICATION: Left hand cellulitis, evaluate for fluid collection  COMPARISON: None.  TECHNIQUE: Postgadolinium T1 sequences were obtained.  IV CONTRAST: 6mL Gadavist    FINDINGS:     Examination is limited due to extensive patient motion artifact despite repeat sequence acquisition.    There is subcutaneous soft tissue edema and enhancement over the hand/wrist most pronounced over the thenar region. This is nonspecific but can be seen with cellulitis in the appropriate clinical setting. No drainable soft tissue fluid collection.    There are scattered degenerative changes most pronounced at the thumb CMC joint where they appear advanced with full-thickness cartilage loss, severe joint space narrowing, and some articular surface remodeling. There are more moderate degenerative   changes involving the thumb MCP joint.    There is a thumb CMC joint effusion/synovitis which could be reactive to the degenerative changes although septic arthritis could have a similar appearance on imaging. No evidence for osteomyelitis.    No tendon tear or definite tenosynovitis.      Impression    IMPRESSION:  1.  Examination is limited due to extensive patient motion artifact despite repeat sequence acquisition.  2.  Subcutaneous soft tissue edema and enhancement over the hand/wrist most pronounced over the thenar region. This is nonspecific but can be seen with cellulitis in the appropriate clinical setting. No drainable soft tissue fluid collection.  3.  Advanced thumb CMC degenerative changes with a joint effusion/synovitis which could be reactive to the degenerative changes although septic arthritis could have a similar appearance on imaging.  4.  No evidence for osteomyelitis.     XR Joint Aspiration Small Left    Narrative    EXAM: FLUOROSCOPICALLY GUIDED LEFT FIRST CMC JOINT ASPIRATION   LOCATION: [facility]  DATE: 5/6/2025      INDICATION: joint effusion, cellulitis. MRI evidence of fluid in the left first CMC.    RADIATION DOSE: Dose Area Product 0.56 microGy-m2    PROCEDURE: Procedure and risks explained and consent received. The skin was prepped and draped in sterile fashion. A small amount of 1% lidocaine was infused into the local soft tissues.     Under direct fluoroscopic guidance, a 25-gauge needle was introduced into the joint space.     COMPLICATIONS: None.    SPECIMEN: 0.75 mL of cloudy yellow fluid was aspirated and sent to lab.      Impression    IMPRESSION: Successful image-guided left first CMC joint aspiration. Sample sent to the lab for analysis.    Reference CPT Codes: 56772, 62969      Most Recent 3 CBC's:  Recent Labs   Lab Test 05/04/25  0551 05/03/25  1121 09/14/24  1229   WBC 8.2 11.4* 9.8   HGB 11.1* 12.1 11.1*   MCV 88 87 91    326 215     Most Recent 3 BMP's:  Recent Labs   Lab Test 05/06/25  0733 05/04/25  0551 05/03/25  1121    140 140   POTASSIUM 3.7 3.6 3.8   CHLORIDE 105 104 101   CO2 24 25 26   BUN 10.0 9.1 8.9   CR 0.90 0.84 0.97*   ANIONGAP 12 11 13   FRANCINE 8.6* 8.8 9.3   * 116* 116*     7-Day Micro Results       Collected Updated Procedure Result Status      05/06/2025 0846 05/06/2025 1244 Aspirate Aerobic Bacterial Culture Routine With Gram Stain [[id number]]   Aspirate from Thumb, Left    Preliminary result Component Value   Gram Stain Result No organisms seen  [P]     4+ WBC seen  [P]     Predominantly PMNs               05/06/2025 0846 05/06/2025 0858 Anaerobic Bacterial Culture Routine [[id number]]   Aspirate from Thumb, Left    In process Component Value   No component results               05/03/2025 1539 05/06/2025 2046 Blood Culture Peripheral Blood [[id number]]   Peripheral Blood    Preliminary result Component Value   Culture No growth after 3 days  [P]                05/03/2025 1522 05/06/2025 2046 Blood Culture Peripheral Blood [[id number]]   Peripheral Blood     Preliminary result Component Value   Culture No growth after 3 days  [P]                      Most Recent ESR & CRP:  Recent Labs   Lab Test 05/07/25  0755 05/06/25  0733 05/05/25  0744   SED  --   --  54*   CRPI 21.69*   < > 71.62*    < > = values in this interval not displayed.

## 2025-05-08 LAB
BACTERIA ASPIRATE CULT: NORMAL
BACTERIA ASPIRATE CULT: NORMAL
BACTERIA BLD CULT: NO GROWTH
BACTERIA BLD CULT: NO GROWTH
GRAM STAIN RESULT: NORMAL
GRAM STAIN RESULT: NORMAL

## 2025-05-11 LAB
BACTERIA ASPIRATE CULT: NO GROWTH
GRAM STAIN RESULT: NORMAL
GRAM STAIN RESULT: NORMAL

## 2025-05-15 LAB — BACTERIA ASPIRATE CULT: NORMAL

## 2025-05-20 LAB — BACTERIA ASPIRATE CULT: NORMAL

## (undated) RX ORDER — LIDOCAINE HYDROCHLORIDE 10 MG/ML
INJECTION, SOLUTION EPIDURAL; INFILTRATION; INTRACAUDAL; PERINEURAL
Status: DISPENSED
Start: 2025-05-06